# Patient Record
Sex: MALE | Race: AMERICAN INDIAN OR ALASKA NATIVE | NOT HISPANIC OR LATINO | Employment: OTHER | URBAN - METROPOLITAN AREA
[De-identification: names, ages, dates, MRNs, and addresses within clinical notes are randomized per-mention and may not be internally consistent; named-entity substitution may affect disease eponyms.]

---

## 2020-09-19 ENCOUNTER — APPOINTMENT (EMERGENCY)
Dept: CT IMAGING | Facility: HOSPITAL | Age: 65
DRG: 558 | End: 2020-09-19
Payer: MEDICARE

## 2020-09-19 ENCOUNTER — HOSPITAL ENCOUNTER (INPATIENT)
Facility: HOSPITAL | Age: 65
LOS: 5 days | Discharge: HOME | DRG: 558 | End: 2020-09-24
Attending: EMERGENCY MEDICINE | Admitting: INTERNAL MEDICINE
Payer: MEDICARE

## 2020-09-19 ENCOUNTER — APPOINTMENT (EMERGENCY)
Dept: NON INVASIVE DIAGNOSTICS | Facility: HOSPITAL | Age: 65
DRG: 558 | End: 2020-09-19
Payer: MEDICARE

## 2020-09-19 ENCOUNTER — APPOINTMENT (EMERGENCY)
Dept: RADIOLOGY | Facility: HOSPITAL | Age: 65
DRG: 558 | End: 2020-09-19
Payer: MEDICARE

## 2020-09-19 DIAGNOSIS — M79.601 PAIN OF RIGHT ARM: Primary | ICD-10-CM

## 2020-09-19 DIAGNOSIS — L03.113 CELLULITIS OF RIGHT UPPER EXTREMITY: ICD-10-CM

## 2020-09-19 DIAGNOSIS — L03.90 CELLULITIS: ICD-10-CM

## 2020-09-19 PROBLEM — R91.1 LUNG NODULE SEEN ON IMAGING STUDY: Status: ACTIVE | Noted: 2020-09-19

## 2020-09-19 LAB
ALBUMIN SERPL BCP-MCNC: 3.9 G/DL (ref 3.5–5)
ALP SERPL-CCNC: 81 U/L (ref 46–116)
ALT SERPL W P-5'-P-CCNC: 38 U/L (ref 12–78)
ANION GAP SERPL CALCULATED.3IONS-SCNC: 7 MMOL/L (ref 4–13)
APTT PPP: 34 SECONDS (ref 23–37)
AST SERPL W P-5'-P-CCNC: 18 U/L (ref 5–45)
BASOPHILS # BLD AUTO: 0.03 THOUSANDS/ΜL (ref 0–0.1)
BASOPHILS NFR BLD AUTO: 0 % (ref 0–1)
BILIRUB SERPL-MCNC: 0.63 MG/DL (ref 0.2–1)
BUN SERPL-MCNC: 12 MG/DL (ref 5–25)
CALCIUM SERPL-MCNC: 9 MG/DL (ref 8.3–10.1)
CHLORIDE SERPL-SCNC: 103 MMOL/L (ref 100–108)
CK MB SERPL-MCNC: 1.4 % (ref 0–2.5)
CK MB SERPL-MCNC: 2.7 NG/ML (ref 0–5)
CK SERPL-CCNC: 200 U/L (ref 39–308)
CO2 SERPL-SCNC: 28 MMOL/L (ref 21–32)
CREAT SERPL-MCNC: 1.05 MG/DL (ref 0.6–1.3)
EOSINOPHIL # BLD AUTO: 0.22 THOUSAND/ΜL (ref 0–0.61)
EOSINOPHIL NFR BLD AUTO: 3 % (ref 0–6)
ERYTHROCYTE [DISTWIDTH] IN BLOOD BY AUTOMATED COUNT: 13.6 % (ref 11.6–15.1)
GFR SERPL CREATININE-BSD FRML MDRD: 75 ML/MIN/1.73SQ M
GLUCOSE SERPL-MCNC: 108 MG/DL (ref 65–140)
GLUCOSE SERPL-MCNC: 137 MG/DL (ref 65–140)
GLUCOSE SERPL-MCNC: 147 MG/DL (ref 65–140)
GLUCOSE SERPL-MCNC: 196 MG/DL (ref 65–140)
HCT VFR BLD AUTO: 44.7 % (ref 36.5–49.3)
HGB BLD-MCNC: 14.8 G/DL (ref 12–17)
IMM GRANULOCYTES # BLD AUTO: 0.03 THOUSAND/UL (ref 0–0.2)
IMM GRANULOCYTES NFR BLD AUTO: 0 % (ref 0–2)
INR PPP: 1.1 (ref 0.84–1.19)
LYMPHOCYTES # BLD AUTO: 1.89 THOUSANDS/ΜL (ref 0.6–4.47)
LYMPHOCYTES NFR BLD AUTO: 23 % (ref 14–44)
MCH RBC QN AUTO: 28 PG (ref 26.8–34.3)
MCHC RBC AUTO-ENTMCNC: 33.1 G/DL (ref 31.4–37.4)
MCV RBC AUTO: 85 FL (ref 82–98)
MONOCYTES # BLD AUTO: 0.77 THOUSAND/ΜL (ref 0.17–1.22)
MONOCYTES NFR BLD AUTO: 9 % (ref 4–12)
NEUTROPHILS # BLD AUTO: 5.23 THOUSANDS/ΜL (ref 1.85–7.62)
NEUTS SEG NFR BLD AUTO: 65 % (ref 43–75)
NRBC BLD AUTO-RTO: 0 /100 WBCS
PLATELET # BLD AUTO: 184 THOUSANDS/UL (ref 149–390)
PMV BLD AUTO: 10.5 FL (ref 8.9–12.7)
POTASSIUM SERPL-SCNC: 3.8 MMOL/L (ref 3.5–5.3)
PROT SERPL-MCNC: 7.8 G/DL (ref 6.4–8.2)
PROTHROMBIN TIME: 14 SECONDS (ref 11.6–14.5)
RBC # BLD AUTO: 5.28 MILLION/UL (ref 3.88–5.62)
SODIUM SERPL-SCNC: 138 MMOL/L (ref 136–145)
TROPONIN I SERPL-MCNC: <0.02 NG/ML
WBC # BLD AUTO: 8.17 THOUSAND/UL (ref 4.31–10.16)

## 2020-09-19 PROCEDURE — 73030 X-RAY EXAM OF SHOULDER: CPT

## 2020-09-19 PROCEDURE — 99285 EMERGENCY DEPT VISIT HI MDM: CPT | Performed by: EMERGENCY MEDICINE

## 2020-09-19 PROCEDURE — 96374 THER/PROPH/DIAG INJ IV PUSH: CPT

## 2020-09-19 PROCEDURE — 73060 X-RAY EXAM OF HUMERUS: CPT

## 2020-09-19 PROCEDURE — 84484 ASSAY OF TROPONIN QUANT: CPT | Performed by: EMERGENCY MEDICINE

## 2020-09-19 PROCEDURE — 99222 1ST HOSP IP/OBS MODERATE 55: CPT | Performed by: SURGERY

## 2020-09-19 PROCEDURE — 93005 ELECTROCARDIOGRAM TRACING: CPT

## 2020-09-19 PROCEDURE — 85730 THROMBOPLASTIN TIME PARTIAL: CPT | Performed by: EMERGENCY MEDICINE

## 2020-09-19 PROCEDURE — 85610 PROTHROMBIN TIME: CPT | Performed by: EMERGENCY MEDICINE

## 2020-09-19 PROCEDURE — 96375 TX/PRO/DX INJ NEW DRUG ADDON: CPT

## 2020-09-19 PROCEDURE — 73070 X-RAY EXAM OF ELBOW: CPT

## 2020-09-19 PROCEDURE — 87040 BLOOD CULTURE FOR BACTERIA: CPT | Performed by: EMERGENCY MEDICINE

## 2020-09-19 PROCEDURE — 85025 COMPLETE CBC W/AUTO DIFF WBC: CPT | Performed by: EMERGENCY MEDICINE

## 2020-09-19 PROCEDURE — 96361 HYDRATE IV INFUSION ADD-ON: CPT

## 2020-09-19 PROCEDURE — G1004 CDSM NDSC: HCPCS

## 2020-09-19 PROCEDURE — 82553 CREATINE MB FRACTION: CPT | Performed by: EMERGENCY MEDICINE

## 2020-09-19 PROCEDURE — 82550 ASSAY OF CK (CPK): CPT | Performed by: EMERGENCY MEDICINE

## 2020-09-19 PROCEDURE — 99223 1ST HOSP IP/OBS HIGH 75: CPT | Performed by: INTERNAL MEDICINE

## 2020-09-19 PROCEDURE — 72125 CT NECK SPINE W/O DYE: CPT

## 2020-09-19 PROCEDURE — 80053 COMPREHEN METABOLIC PANEL: CPT | Performed by: EMERGENCY MEDICINE

## 2020-09-19 PROCEDURE — 82948 REAGENT STRIP/BLOOD GLUCOSE: CPT

## 2020-09-19 PROCEDURE — 99285 EMERGENCY DEPT VISIT HI MDM: CPT

## 2020-09-19 PROCEDURE — 36415 COLL VENOUS BLD VENIPUNCTURE: CPT | Performed by: EMERGENCY MEDICINE

## 2020-09-19 PROCEDURE — 93971 EXTREMITY STUDY: CPT | Performed by: SURGERY

## 2020-09-19 PROCEDURE — 93971 EXTREMITY STUDY: CPT

## 2020-09-19 PROCEDURE — 71275 CT ANGIOGRAPHY CHEST: CPT

## 2020-09-19 RX ORDER — HYDROMORPHONE HCL/PF 1 MG/ML
1 SYRINGE (ML) INJECTION ONCE
Status: COMPLETED | OUTPATIENT
Start: 2020-09-19 | End: 2020-09-19

## 2020-09-19 RX ORDER — EXENATIDE 2 MG/.65ML
2 INJECTION, SUSPENSION, EXTENDED RELEASE SUBCUTANEOUS WEEKLY
COMMUNITY
Start: 2020-04-09

## 2020-09-19 RX ORDER — OXYCODONE HYDROCHLORIDE AND ACETAMINOPHEN 5; 325 MG/1; MG/1
1 TABLET ORAL ONCE
Status: COMPLETED | OUTPATIENT
Start: 2020-09-19 | End: 2020-09-19

## 2020-09-19 RX ORDER — CEFAZOLIN SODIUM 2 G/50ML
2000 SOLUTION INTRAVENOUS ONCE
Status: COMPLETED | OUTPATIENT
Start: 2020-09-19 | End: 2020-09-19

## 2020-09-19 RX ORDER — SODIUM CHLORIDE 9 MG/ML
50 INJECTION, SOLUTION INTRAVENOUS CONTINUOUS
Status: DISCONTINUED | OUTPATIENT
Start: 2020-09-19 | End: 2020-09-22

## 2020-09-19 RX ORDER — ONDANSETRON 2 MG/ML
4 INJECTION INTRAMUSCULAR; INTRAVENOUS EVERY 4 HOURS PRN
Status: DISCONTINUED | OUTPATIENT
Start: 2020-09-19 | End: 2020-09-24 | Stop reason: HOSPADM

## 2020-09-19 RX ORDER — OXYCODONE HYDROCHLORIDE 5 MG/1
5 TABLET ORAL EVERY 4 HOURS PRN
Status: DISCONTINUED | OUTPATIENT
Start: 2020-09-19 | End: 2020-09-22

## 2020-09-19 RX ORDER — FOLIC ACID/MULTIVIT,IRON,MINER .4-18-35
1 TABLET,CHEWABLE ORAL DAILY
COMMUNITY

## 2020-09-19 RX ORDER — ACETAMINOPHEN 325 MG/1
650 TABLET ORAL EVERY 6 HOURS PRN
Status: DISCONTINUED | OUTPATIENT
Start: 2020-09-19 | End: 2020-09-24 | Stop reason: HOSPADM

## 2020-09-19 RX ORDER — ASPIRIN 81 MG/1
81 TABLET ORAL DAILY
COMMUNITY
Start: 2019-11-27 | End: 2020-11-26

## 2020-09-19 RX ORDER — KETOROLAC TROMETHAMINE 30 MG/ML
30 INJECTION, SOLUTION INTRAMUSCULAR; INTRAVENOUS EVERY 6 HOURS SCHEDULED
Status: DISPENSED | OUTPATIENT
Start: 2020-09-19 | End: 2020-09-21

## 2020-09-19 RX ORDER — CEFAZOLIN SODIUM 2 G/50ML
2000 SOLUTION INTRAVENOUS EVERY 8 HOURS
Status: DISCONTINUED | OUTPATIENT
Start: 2020-09-20 | End: 2020-09-24

## 2020-09-19 RX ORDER — KETOROLAC TROMETHAMINE 30 MG/ML
15 INJECTION, SOLUTION INTRAMUSCULAR; INTRAVENOUS ONCE
Status: COMPLETED | OUTPATIENT
Start: 2020-09-19 | End: 2020-09-19

## 2020-09-19 RX ORDER — HYDROMORPHONE HCL/PF 1 MG/ML
0.5 SYRINGE (ML) INJECTION EVERY 4 HOURS PRN
Status: DISCONTINUED | OUTPATIENT
Start: 2020-09-19 | End: 2020-09-24 | Stop reason: HOSPADM

## 2020-09-19 RX ORDER — TIZANIDINE 2 MG/1
4 TABLET ORAL 3 TIMES DAILY
Status: DISCONTINUED | OUTPATIENT
Start: 2020-09-19 | End: 2020-09-24 | Stop reason: HOSPADM

## 2020-09-19 RX ORDER — LISINOPRIL 20 MG/1
40 TABLET ORAL DAILY
Status: DISCONTINUED | OUTPATIENT
Start: 2020-09-20 | End: 2020-09-24 | Stop reason: HOSPADM

## 2020-09-19 RX ORDER — LISINOPRIL 40 MG/1
40 TABLET ORAL DAILY
COMMUNITY
Start: 2020-04-09

## 2020-09-19 RX ORDER — ASPIRIN 81 MG/1
81 TABLET ORAL DAILY
Status: DISCONTINUED | OUTPATIENT
Start: 2020-09-20 | End: 2020-09-24 | Stop reason: HOSPADM

## 2020-09-19 RX ADMIN — KETOROLAC TROMETHAMINE 30 MG: 30 INJECTION, SOLUTION INTRAMUSCULAR at 18:42

## 2020-09-19 RX ADMIN — KETOROLAC TROMETHAMINE 15 MG: 30 INJECTION, SOLUTION INTRAMUSCULAR at 15:00

## 2020-09-19 RX ADMIN — CEFAZOLIN SODIUM 2000 MG: 2 SOLUTION INTRAVENOUS at 17:59

## 2020-09-19 RX ADMIN — KETOROLAC TROMETHAMINE 30 MG: 30 INJECTION, SOLUTION INTRAMUSCULAR at 23:12

## 2020-09-19 RX ADMIN — SODIUM CHLORIDE 50 ML/HR: 0.9 INJECTION, SOLUTION INTRAVENOUS at 18:47

## 2020-09-19 RX ADMIN — SODIUM CHLORIDE 50 ML/HR: 0.9 INJECTION, SOLUTION INTRAVENOUS at 17:53

## 2020-09-19 RX ADMIN — TIZANIDINE 4 MG: 2 TABLET ORAL at 18:40

## 2020-09-19 RX ADMIN — IOHEXOL 85 ML: 350 INJECTION, SOLUTION INTRAVENOUS at 14:26

## 2020-09-19 RX ADMIN — SODIUM CHLORIDE 1000 ML: 0.9 INJECTION, SOLUTION INTRAVENOUS at 13:25

## 2020-09-19 RX ADMIN — OXYCODONE HYDROCHLORIDE AND ACETAMINOPHEN 1 TABLET: 5; 325 TABLET ORAL at 13:56

## 2020-09-19 RX ADMIN — OXYCODONE HYDROCHLORIDE 5 MG: 5 TABLET ORAL at 18:40

## 2020-09-19 RX ADMIN — HYDROMORPHONE HYDROCHLORIDE 1 MG: 1 INJECTION, SOLUTION INTRAMUSCULAR; INTRAVENOUS; SUBCUTANEOUS at 16:43

## 2020-09-19 NOTE — H&P
H&P- Janet Coburn  1955, 60 y o  male MRN: [id number]    Unit/Bed#: ED 21 Encounter: [id number]    Primary Care Provider: Alessio Agarwal MD   Date and time admitted to hospital: 9/19/2020 12:10 PM        Assessment and Plan  * Cellulitis of right upper extremity  Assessment & Plan  · Cellulitis of right upper extremity including elbow shoulder and chest wall  · Extensive imaging negative for thrombus or abscess  · Continue IV cefazolin for cellulitis  · Have orthopedics evaluate for olecranon bursitis  · Will schedule ketorolac and tizanidine for musculoskeletal component of pain    Hypertension  Assessment & Plan  · Essential hypertension continue lisinopril 40 mg daily    Diabetes mellitus [facility])  Assessment & Plan  Lab Results   Component Value Date    HGBA1C 4 9 03/15/2019     Recent Labs     09/19/20  1850   POCGLU 108     · Diabetes mellitus prior to admission exenatide weekly  Will be held in favor sliding scale insulin during hospitalization      VTE Prophylaxis: Enoxaparin (Lovenox)  Code Status: Level 1 - Full Code  Anticipated Length of Stay:  Patient will be admitted on an Inpatient basis with an anticipated length of stay of  greater than 2 midnights  Justification for Hospital Stay: Cellulitis of right upper extremity  Total Time for Visit, including Counseling / Coordination of Care: xx mins  Greater than 50% of this total time spent on direct patient counseling and coordination of care  Chief Complaint:     Chief Complaint   Patient presents with    Arm Swelling     with right arm pain and swelling since yesterday getting worse also c/o tinggling     History of Present Illness:    Janet Coburn  is a 60 y o  male who presents with worsening right upper extremity pain between right neck/chest wall and right elbow  The patient delivers medications for local nursing homes and 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 injury    Symptoms began yesterday but worsened throughout the morning today prompting evaluation in the emergency department  He denies any fevers or chills  In the emergency department there was some concern of compartment syndrome or abscess and the patient underwent significant amount of imaging including CT scan and venous duplex negative for DVT  He was seen by surgical service and did not appear to have compartment syndrome  Review of Systems:  Review of Systems   Constitutional: Negative for chills, diaphoresis and fever  HENT: Negative for dental problem and facial swelling  Eyes: Negative for photophobia, pain and visual disturbance  Respiratory: Negative for shortness of breath, wheezing and stridor  Cardiovascular: Negative for chest pain and palpitations  Gastrointestinal: Negative for abdominal distention, abdominal pain, diarrhea, nausea and vomiting  Genitourinary: Negative for dysuria, hematuria and urgency  Musculoskeletal: Positive for arthralgias and myalgias  Negative for back pain  Pain right elbow shoulder and chest   Skin: Negative for rash  Neurological: Negative for dizziness, seizures, speech difficulty, numbness and headaches  Psychiatric/Behavioral: Negative for agitation and suicidal ideas  The patient is not nervous/anxious  All other systems reviewed and are negative  Past Medical and Surgical History:   Past Medical History:   Diagnosis Date    Diabetes mellitus ([facility] [address] )     Hypertension      Past Surgical History:   Procedure Laterality Date    HAND SURGERY      KNEE SURGERY      SPINE SURGERY       Meds/Allergies: Allergies:    Allergies   Allergen Reactions    Morphine GI Intolerance and Itching    Celecoxib Other (See Comments)     pt can't urinate      Fentanyl      Other reaction(s): dizziness      Finasteride GI Intolerance    Naproxen      Other reaction(s): worsening knee pain      Oxycodone      Other reaction(s): itching     Prior to Admission Medications   Prescriptions Last Dose Informant Patient Reported? Taking? Exenatide ER (Bydureon) 2 MG PEN   Yes Yes   Sig: Inject 2 mg under the skin once a week   aspirin (Aspirin 81) 81 mg EC tablet   Yes Yes   Sig: Take 81 mg by mouth daily   lisinopril (ZESTRIL) 40 mg tablet   Yes Yes   Sig: Take 40 mg by mouth daily   multivitamin-iron-minerals-folic acid (CENTRUM) chewable tablet   Yes Yes   Sig: Chew 1 tablet daily      Facility-Administered Medications: None     Social History:     Social History     Socioeconomic History    Marital status: Single     Spouse name: Not on file    Number of children: Not on file    Years of education: Not on file    Highest education level: Not on file   Occupational History    Not on file   Social Needs    Financial resource strain: Not on file    Food insecurity     Worry: Not on file     Inability: Not on file    Transportation needs     Medical: Not on file     Non-medical: Not on file   Tobacco Use    Smoking status: Never Smoker    Smokeless tobacco: Never Used   Substance and Sexual Activity    Alcohol use:  Yes     Alcohol/week: 1 0 standard drinks     Types: 1 Cans of beer per week    Drug use: Never    Sexual activity: Not on file   Lifestyle    Physical activity     Days per week: Not on file     Minutes per session: Not on file    Stress: Not on file   Relationships    Social connections     Talks on phone: Not on file     Gets together: Not on file     Attends Christian service: Not on file     Active member of club or organization: Not on file     Attends meetings of clubs or organizations: Not on file     Relationship status: Not on file    Intimate partner violence     Fear of current or ex partner: Not on file     Emotionally abused: Not on file     Physically abused: Not on file     Forced sexual activity: Not on file   Other Topics Concern    Not on file   Social History Narrative    Not on file     Patient Pre-hospital Living Situation:   Patient Pre-hospital Level of Mobility:   Patient Pre-hospital Diet Restrictions:     Family History:  History reviewed  No pertinent family history  Physical Exam:   Vitals:   Blood Pressure: 166/81 (09/19/20 1802)  Pulse: 68 (09/19/20 1802)  Temperature: (!) 97 4 °F (36 3 °C) (09/19/20 1204)  Temp Source: Tympanic (09/19/20 1204)  Respirations: 19 (09/19/20 1802)  Weight - Scale: 125 kg (274 lb 14 6 oz) (09/19/20 1204)  SpO2: 98 % (09/19/20 1802)    Physical Exam  Vitals signs reviewed  Constitutional:       General: He is not in acute distress  HENT:      Head: Atraumatic  Mouth/Throat:      Mouth: Mucous membranes are moist    Eyes:      Extraocular Movements: Extraocular movements intact  Pupils: Pupils are equal, round, and reactive to light  Cardiovascular:      Rate and Rhythm: Regular rhythm  Heart sounds: Normal heart sounds  Pulmonary:      Effort: Pulmonary effort is normal       Breath sounds: No wheezing  Abdominal:      General: Bowel sounds are normal       Palpations: Abdomen is soft  Tenderness: There is no abdominal tenderness  There is no rebound  Musculoskeletal:         General: Tenderness (Right chest wall shoulder elbow tender to palpation) present  No swelling  Skin:     General: Skin is warm and dry  Neurological:      General: No focal deficit present  Mental Status: He is alert and oriented to person, place, and time  Cranial Nerves: No cranial nerve deficit  Psychiatric:         Mood and Affect: Mood normal        Lab Results: I have personally reviewed pertinent reports      Results from last 7 days   Lab Units 09/19/20  1326   WBC Thousand/uL 8 17   HEMOGLOBIN g/dL 14 8   HEMATOCRIT % 44 7   PLATELETS Thousands/uL 184   NEUTROS PCT % 65   LYMPHS PCT % 23   MONOS PCT % 9   EOS PCT % 3     Results from last 7 days   Lab Units 09/19/20  1326   SODIUM mmol/L 138   POTASSIUM mmol/L 3 8   CHLORIDE mmol/L 103   CO2 mmol/L 28   ANION GAP mmol/L 7   BUN mg/dL 12   CREATININE mg/dL 1 05   CALCIUM mg/dL 9 0   ALBUMIN g/dL 3 9   TOTAL BILIRUBIN mg/dL 0 63   ALK PHOS U/L 81   ALT U/L 38   AST U/L 18   EGFR ml/min/1 73sq m 75   GLUCOSE RANDOM mg/dL 137     Results from last 7 days   Lab Units 09/19/20  1326   INR  1 10     Results from last 7 days   Lab Units 09/19/20  1456 09/19/20  1326   CK TOTAL U/L  --  200   TROPONIN I ng/mL <0 02  --    CK MB INDEX %  --  1 4           Imaging: I have personally reviewed pertinent films in PACS  Xr Shoulder 2+ Views Right    Result Date: 9/19/2020  Impression: No acute osseous abnormality  Mild osteoarthritis acromioclavicular joint  Workstation performed: [id number]     Xr Humerus Right    Result Date: 9/19/2020  Impression: No acute osseous abnormality  Workstation performed: [id number]     Xr Elbow 2 Vw Right    Result Date: 9/19/2020  Impression: No acute osseous abnormality  Soft tissue swelling over the olecranon process, which could be olecranon bursitis  Workstation performed: [id number]     Ct Cervical Spine Without Contrast    Result Date: 9/19/2020  Impression: Mild multilevel degenerative spondylosis No cervical spine fracture or traumatic malalignment  Workstation performed: [id number]       EKG, Pathology, and Other Studies Reviewed on Admission:   Reviewed outside records    Allscripts/ Epic Records Reviewed: Yes    ** Please Note: This note has been constructed using a voice recognition system   **

## 2020-09-19 NOTE — ASSESSMENT & PLAN NOTE
· Cellulitis of right upper extremity including elbow shoulder and chest wall  · Extensive imaging negative for thrombus or abscess  · Continue IV cefazolin for cellulitis  · Have orthopedics evaluate for olecranon bursitis  · Will schedule ketorolac and tizanidine for musculoskeletal component of pain

## 2020-09-19 NOTE — ED PROVIDER NOTES
History  Chief Complaint   Patient presents with    Arm Swelling     with right arm pain and swelling since yesterday getting worse also c/o tinggling       History provided by:  Patient   used: No    Arm Pain   Location:  Right arm  Quality:  Pain and swelling  Severity:  Moderate  Onset quality:  Gradual  Duration:  1 day  Timing:  Intermittent  Progression:  Waxing and waning  Chronicity:  New  Context:  Right arm pain and swelling for past 1 day, no trauma  Relieved by:  Nothing  Worsened by:  Nothing  Ineffective treatments:  Nothing  Associated symptoms: no abdominal pain, no chest pain, no cough, no fever, no headaches, no loss of consciousness, no rash, no shortness of breath, no sore throat and no vomiting    Risk factors:  HTN, DM      Prior to Admission Medications   Prescriptions Last Dose Informant Patient Reported? Taking? Exenatide ER (Bydureon) 2 MG PEN   Yes Yes   Sig: Inject 2 mg under the skin once a week   aspirin (Aspirin 81) 81 mg EC tablet   Yes Yes   Sig: Take 81 mg by mouth daily   lisinopril (ZESTRIL) 40 mg tablet   Yes Yes   Sig: Take 40 mg by mouth daily   multivitamin-iron-minerals-folic acid (CENTRUM) chewable tablet   Yes Yes   Sig: Chew 1 tablet daily      Facility-Administered Medications: None       Past Medical History:   Diagnosis Date    Diabetes mellitus ([facility] [address] )     Hypertension        Past Surgical History:   Procedure Laterality Date    HAND SURGERY      KNEE SURGERY      SPINE SURGERY         History reviewed  No pertinent family history  I have reviewed and agree with the history as documented  E-Cigarette/Vaping    E-Cigarette Use Never User      E-Cigarette/Vaping Substances     Social History     Tobacco Use    Smoking status: Never Smoker    Smokeless tobacco: Never Used   Substance Use Topics    Alcohol use:  Yes     Alcohol/week: 1 0 standard drinks     Types: 1 Cans of beer per week    Drug use: Never       Review of Systems Constitutional: Negative for chills and fever  HENT: Negative for facial swelling, sore throat and trouble swallowing  Eyes: Negative for pain and visual disturbance  Respiratory: Negative for cough, chest tightness and shortness of breath  Cardiovascular: Negative for chest pain and leg swelling  Gastrointestinal: Negative for abdominal pain and vomiting  Genitourinary: Negative for dysuria and flank pain  Musculoskeletal: Negative for back pain, neck pain and neck stiffness  Right arm pain and swelling   Skin: Negative for pallor and rash  Allergic/Immunologic: Negative for environmental allergies and immunocompromised state  Neurological: Negative for dizziness, loss of consciousness and headaches  Hematological: Negative for adenopathy  Does not bruise/bleed easily  Psychiatric/Behavioral: Negative for agitation and behavioral problems  All other systems reviewed and are negative  Physical Exam  Physical Exam  Vitals signs and nursing note reviewed  Constitutional:       General: He is not in acute distress  Appearance: He is well-developed  HENT:      Head: Normocephalic and atraumatic  Neck:      Musculoskeletal: Normal range of motion and neck supple  Cardiovascular:      Rate and Rhythm: Normal rate and regular rhythm  Pulmonary:      Effort: Pulmonary effort is normal    Abdominal:      Palpations: Abdomen is soft  Tenderness: There is no abdominal tenderness  There is no guarding or rebound  Musculoskeletal:         General: Swelling and tenderness present  Comments: Generalized swelling and pain of right upper arm, elbow and forearm, swelling of olecranon bursa, no erythema or crepitus, NV intact distally   Skin:     General: Skin is warm and dry  Neurological:      Mental Status: He is alert and oriented to person, place, and time           Vital Signs  ED Triage Vitals [09/19/20 1204]   Temperature Pulse Respirations Blood Pressure SpO2 Andreea Rodrigues 97 4 °F (36 3 °C) 65 18 160/72 97 %      Temp Source Heart Rate Source Patient Position - Orthostatic VS BP Location FiO2 (%)   Tympanic Monitor Sitting Right arm --      Pain Score       9           Vitals:    09/19/20 1204 09/19/20 1529 09/19/20 1700 09/19/20 1802   BP: 160/72 163/79 153/72 166/81   Pulse: 65 68 70 68   Patient Position - Orthostatic VS: Sitting Lying  Lying         Visual Acuity      ED Medications  Medications   ceFAZolin (ANCEF) IVPB (premix in dextrose) 2,000 mg 50 mL (2,000 mg Intravenous New Bag 9/19/20 1759)   sodium chloride 0 9 % infusion (50 mL/hr Intravenous New Bag 9/19/20 1753)   insulin lispro (HumaLOG) 100 units/mL subcutaneous injection 1-6 Units (has no administration in time range)   sodium chloride 0 9 % bolus 1,000 mL (0 mL Intravenous Stopped 9/19/20 1439)   oxyCODONE-acetaminophen (PERCOCET) 5-325 mg per tablet 1 tablet (1 tablet Oral Given 9/19/20 1356)   iohexol (OMNIPAQUE) 350 MG/ML injection (SINGLE-DOSE) 85 mL (85 mL Intravenous Given 9/19/20 1426)   HYDROmorphone (DILAUDID) injection 1 mg (1 mg Intravenous Given 9/19/20 1643)   ketorolac (TORADOL) injection 15 mg (15 mg Intravenous Given 9/19/20 1500)       Diagnostic Studies  Results Reviewed     Procedure Component Value Units Date/Time    Blood culture #2 [[id number]] Collected:  09/19/20 1731    Lab Status: In process Specimen:  Blood from Hand, Left Updated:  09/19/20 1752    Blood culture #1 [[id number]] Collected:  09/19/20 1748    Lab Status:   In process Specimen:  Blood from Arm, Left Updated:  09/19/20 1752    Troponin I [[id number]]  (Normal) Collected:  09/19/20 1456    Lab Status:  Final result Specimen:  Blood from Arm, Left Updated:  09/19/20 1539     Troponin I <0 02 ng/mL     CKMB [[id number]]  (Normal) Collected:  09/19/20 1326    Lab Status:  Final result Specimen:  Blood from Arm, Left Updated:  09/19/20 1404     CK-MB Index 1 4 %      CK-MB 2 7 ng/mL     CK Total with Reflex CKMB [[id number]] (Normal) Collected:  09/19/20 1326    Lab Status:  Final result Specimen:  Blood from Arm, Left Updated:  09/19/20 1404     Total  U/L     Comprehensive metabolic panel [[id number]] Collected:  09/19/20 1326    Lab Status:  Final result Specimen:  Blood from Arm, Left Updated:  09/19/20 1349     Sodium 138 mmol/L      Potassium 3 8 mmol/L      Chloride 103 mmol/L      CO2 28 mmol/L      ANION GAP 7 mmol/L      BUN 12 mg/dL      Creatinine 1 05 mg/dL      Glucose 137 mg/dL      Calcium 9 0 mg/dL      AST 18 U/L      ALT 38 U/L      Alkaline Phosphatase 81 U/L      Total Protein 7 8 g/dL      Albumin 3 9 g/dL      Total Bilirubin 0 63 mg/dL      eGFR 75 ml/min/1 73sq m     Narrative:       [place] guidelines for Chronic Kidney Disease (CKD):     Stage 1 with normal or high GFR (GFR > 90 mL/min/1 73 square meters)    Stage 2 Mild CKD (GFR = 60-89 mL/min/1 73 square meters)    Stage 3A Moderate CKD (GFR = 45-59 mL/min/1 73 square meters)    Stage 3B Moderate CKD (GFR = 30-44 mL/min/1 73 square meters)    Stage 4 Severe CKD (GFR = 15-29 mL/min/1 73 square meters)    Stage 5 End Stage CKD (GFR <15 mL/min/1 73 square meters)  Note: GFR calculation is accurate only with a steady state creatinine    Protime-INR [[id number]]  (Normal) Collected:  09/19/20 1326    Lab Status:  Final result Specimen:  Blood from Arm, Left Updated:  09/19/20 1341     Protime 14 0 seconds      INR 1 10    APTT [[id number]]  (Normal) Collected:  09/19/20 1326    Lab Status:  Final result Specimen:  Blood from Arm, Left Updated:  09/19/20 1341     PTT 34 seconds     CBC and differential [[id number]] Collected:  09/19/20 1326    Lab Status:  Final result Specimen:  Blood from Arm, Left Updated:  09/19/20 1330     WBC 8 17 Thousand/uL      RBC 5 28 Million/uL      Hemoglobin 14 8 g/dL      Hematocrit 44 7 %      MCV 85 fL      MCH 28 0 pg      MCHC 33 1 g/dL      RDW 13 6 %      MPV 10 5 fL      Platelets 162 Thousands/uL      nRBC 0 /100 WBCs      Neutrophils Relative 65 %      Immat GRANS % 0 %      Lymphocytes Relative 23 %      Monocytes Relative 9 %      Eosinophils Relative 3 %      Basophils Relative 0 %      Neutrophils Absolute 5 23 Thousands/µL      Immature Grans Absolute 0 03 Thousand/uL      Lymphocytes Absolute 1 89 Thousands/µL      Monocytes Absolute 0 77 Thousand/µL      Eosinophils Absolute 0 22 Thousand/µL      Basophils Absolute 0 03 Thousands/µL                  VAS upper limb venous duplex scan, unilateral/limited   Final Result by Javier Alex MD (09/19 1749)      CT cervical spine without contrast   Final Result by Porsche Aguilar MD (09/19 1441)   Mild multilevel degenerative spondylosis      No cervical spine fracture or traumatic malalignment  Workstation performed: [id number]         XR shoulder 2+ views RIGHT   Final Result by Ayesha iFsher MD (09/19 1411)      No acute osseous abnormality  Mild osteoarthritis acromioclavicular joint  Workstation performed: [id number]         XR humerus RIGHT   Final Result by Ayesha Fisher MD (09/19 1412)      No acute osseous abnormality  Workstation performed: [id number]         XR elbow 2 vw RIGHT   Final Result by Ayesha Fisher MD (09/19 1413)      No acute osseous abnormality  Soft tissue swelling over the olecranon process, which could be olecranon bursitis        Workstation performed: [id number]         CTA chest wo w contrast    (Results Pending)              Procedures  ECG 12 Lead Documentation Only    Date/Time: 9/19/2020 3:23 PM  Performed by: Jenny Tse MD  Authorized by: Jenny Tse MD     Indications / Diagnosis:  Right arm pain  ECG reviewed by me, the ED Provider: yes    Patient location:  ED  Interpretation:     Interpretation: normal    Rate:     ECG rate assessment: normal    Rhythm:     Rhythm: sinus rhythm    Ectopy:     Ectopy: none    QRS:     QRS axis:  Normal  Conduction:     Conduction: normal    ST segments:     ST segments:  Normal  T waves:     T waves: normal               ED Course  ED Course as of Sep 19 1817   Sat Sep 19, 2020   1341 WBC: 8 17   1341 Hemoglobin: 14 8   1342 CBC wnl  Platelet Count: 386   0413 Patient having significant pain, has many allergies, says he can take Percocet, we will order  1406 CK wnl  Total CK: 200   1409 XR RUE (Shoulder, Arm, elbow) reviewed, discussed with Dr Violet Dancer, Radiologist, specifically elbow, no osseous injury, no joint effusion; possible Olecranon bursitis  1529 Duplex is negative for DVT and negative for superficial thrombophlebitis in his right upper ext as per Vascular tech (Maryam Granados) report  1547 Patient c/o pain, we will give Dilaudid  Surgery contacted for possible Nec fasc; however patient remains stable, non-toxic, normal labs  1705 CT chest negative for any acute thoracic pathology as per Dr Katy Reynoso, IR Rad       0707 Seen by Surgery resident, discussed with Attending Dr Ray Yousif, do not think its Nec Fasc or Compartment syndrome  We will admit for possible RUE Cellulitis  Discussed with Dr Amos Posada, [facility]  MDM  Number of Diagnoses or Management Options  Cellulitis:   Pain of right arm:   Diagnosis management comments: Patient is a 61-year-old male, history of hypertension, diabetes, comes in with complaints of right upper extremity pain and swelling, started yesterday, denies any injuries, no fever, chills, symptoms came on gradually, got worse overnight; patient also complains of lower neck and the scapular region pain, no history of DVT/PE  On exam, patient is conscious, alert, vital signs are stable, there is generalized swelling of the right upper extremity involving upper arm, elbow, forearm, enlarged olecranon bursitis also noted, strong radial pulse, neurovascular intact distally    Differential diagnosis:  Right arm cellulitis, cervical radiculopathy, thoracic outlet syndrome  Will check labs, x-ray, get CT scan of C-spine and chest        Amount and/or Complexity of Data Reviewed  Clinical lab tests: reviewed and ordered  Tests in the radiology section of CPT®: ordered and reviewed  Tests in the medicine section of CPT®: ordered and reviewed  Independent visualization of images, tracings, or specimens: yes        Disposition  Final diagnoses:   Pain of right arm   Cellulitis     Time reflects when diagnosis was documented in both MDM as applicable and the Disposition within this note     Time User Action Codes Description Comment    9/19/2020  4:25 PM Mary Running Add [M79 [id number]] Pain of right arm     9/19/2020  5:25 PM Mary Running Add [G06 [id number]] Cellulitis       ED Disposition     ED Disposition Condition Date/Time Comment    Admit Stable Sat Sep 19, 2020  5:25 PM Case was discussed with Dr Amauri Lopez and the patient's admission status was agreed to be Admission Status: inpatient status to the service of Dr Amauri Lopez  Follow-up Information    None         Current Discharge Medication List      CONTINUE these medications which have NOT CHANGED    Details   aspirin (Aspirin 81) 81 mg EC tablet Take 81 mg by mouth daily      Exenatide ER (Bydureon) 2 MG PEN Inject 2 mg under the skin once a week      lisinopril (ZESTRIL) 40 mg tablet Take 40 mg by mouth daily      multivitamin-iron-minerals-folic acid (CENTRUM) chewable tablet Chew 1 tablet daily           No discharge procedures on file      PDMP Review     None          ED Provider  Electronically Signed by           Crystal Angela MD  09/19/20 1537
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
## 2020-09-19 NOTE — LETTER
[address] [place] [id number]  Dept: [phone]    September 24, 2020     Patient: Sha Cuellar  YOB: 1955   Date of Visit: 9/19/2020       To Whom it May Concern:    Selvin Escalante is under my professional care  He was seen in the hospital from 9/19/2020   to 09/24/20  He may return to work on 9/28/2020 with the following limitations Avoid strenuous heavy lifting of the affected arm  If you have any questions or concerns, please don't hesitate to call           Sincerely,          Lior Troy MD

## 2020-09-19 NOTE — ASSESSMENT & PLAN NOTE
Lab Results   Component Value Date    HGBA1C 4 9 03/15/2019     Recent Labs     09/19/20  1850   POCGLU 108     · Diabetes mellitus prior to admission exenatide weekly    Will be held in favor sliding scale insulin during hospitalization

## 2020-09-20 LAB
ALBUMIN SERPL BCP-MCNC: 3.5 G/DL (ref 3.5–5)
ALP SERPL-CCNC: 75 U/L (ref 46–116)
ALT SERPL W P-5'-P-CCNC: 30 U/L (ref 12–78)
ANION GAP SERPL CALCULATED.3IONS-SCNC: 8 MMOL/L (ref 4–13)
AST SERPL W P-5'-P-CCNC: 15 U/L (ref 5–45)
BILIRUB SERPL-MCNC: 0.53 MG/DL (ref 0.2–1)
BUN SERPL-MCNC: 12 MG/DL (ref 5–25)
CALCIUM SERPL-MCNC: 8.8 MG/DL (ref 8.3–10.1)
CHLORIDE SERPL-SCNC: 103 MMOL/L (ref 100–108)
CO2 SERPL-SCNC: 26 MMOL/L (ref 21–32)
CREAT SERPL-MCNC: 1 MG/DL (ref 0.6–1.3)
ERYTHROCYTE [DISTWIDTH] IN BLOOD BY AUTOMATED COUNT: 13.9 % (ref 11.6–15.1)
GFR SERPL CREATININE-BSD FRML MDRD: 79 ML/MIN/1.73SQ M
GLUCOSE SERPL-MCNC: 129 MG/DL (ref 65–140)
GLUCOSE SERPL-MCNC: 153 MG/DL (ref 65–140)
GLUCOSE SERPL-MCNC: 163 MG/DL (ref 65–140)
GLUCOSE SERPL-MCNC: 178 MG/DL (ref 65–140)
GLUCOSE SERPL-MCNC: 187 MG/DL (ref 65–140)
HCT VFR BLD AUTO: 43.3 % (ref 36.5–49.3)
HGB BLD-MCNC: 13.9 G/DL (ref 12–17)
MCH RBC QN AUTO: 27.7 PG (ref 26.8–34.3)
MCHC RBC AUTO-ENTMCNC: 32.1 G/DL (ref 31.4–37.4)
MCV RBC AUTO: 86 FL (ref 82–98)
PLATELET # BLD AUTO: 159 THOUSANDS/UL (ref 149–390)
PMV BLD AUTO: 10.8 FL (ref 8.9–12.7)
POTASSIUM SERPL-SCNC: 3.8 MMOL/L (ref 3.5–5.3)
PROT SERPL-MCNC: 7.2 G/DL (ref 6.4–8.2)
RBC # BLD AUTO: 5.01 MILLION/UL (ref 3.88–5.62)
SODIUM SERPL-SCNC: 137 MMOL/L (ref 136–145)
WBC # BLD AUTO: 8.01 THOUSAND/UL (ref 4.31–10.16)

## 2020-09-20 PROCEDURE — 85027 COMPLETE CBC AUTOMATED: CPT | Performed by: INTERNAL MEDICINE

## 2020-09-20 PROCEDURE — 99222 1ST HOSP IP/OBS MODERATE 55: CPT | Performed by: ORTHOPAEDIC SURGERY

## 2020-09-20 PROCEDURE — 80053 COMPREHEN METABOLIC PANEL: CPT | Performed by: INTERNAL MEDICINE

## 2020-09-20 PROCEDURE — 82948 REAGENT STRIP/BLOOD GLUCOSE: CPT

## 2020-09-20 PROCEDURE — 99232 SBSQ HOSP IP/OBS MODERATE 35: CPT | Performed by: FAMILY MEDICINE

## 2020-09-20 RX ADMIN — KETOROLAC TROMETHAMINE 30 MG: 30 INJECTION, SOLUTION INTRAMUSCULAR at 13:12

## 2020-09-20 RX ADMIN — LISINOPRIL 40 MG: 20 TABLET ORAL at 08:13

## 2020-09-20 RX ADMIN — KETOROLAC TROMETHAMINE 30 MG: 30 INJECTION, SOLUTION INTRAMUSCULAR at 05:02

## 2020-09-20 RX ADMIN — OXYCODONE HYDROCHLORIDE 5 MG: 5 TABLET ORAL at 21:11

## 2020-09-20 RX ADMIN — Medication 1 TABLET: at 08:06

## 2020-09-20 RX ADMIN — CEFAZOLIN SODIUM 2000 MG: 2 SOLUTION INTRAVENOUS at 02:17

## 2020-09-20 RX ADMIN — HYDROMORPHONE HYDROCHLORIDE 0.5 MG: 1 INJECTION, SOLUTION INTRAMUSCULAR; INTRAVENOUS; SUBCUTANEOUS at 02:26

## 2020-09-20 RX ADMIN — TIZANIDINE 4 MG: 2 TABLET ORAL at 16:45

## 2020-09-20 RX ADMIN — INSULIN LISPRO 1 UNITS: 100 INJECTION, SOLUTION INTRAVENOUS; SUBCUTANEOUS at 08:03

## 2020-09-20 RX ADMIN — TIZANIDINE 4 MG: 2 TABLET ORAL at 21:11

## 2020-09-20 RX ADMIN — CEFAZOLIN SODIUM 2000 MG: 2 SOLUTION INTRAVENOUS at 17:50

## 2020-09-20 RX ADMIN — ASPIRIN 81 MG: 81 TABLET, COATED ORAL at 08:06

## 2020-09-20 RX ADMIN — ENOXAPARIN SODIUM 40 MG: 40 INJECTION SUBCUTANEOUS at 08:06

## 2020-09-20 RX ADMIN — TIZANIDINE 4 MG: 2 TABLET ORAL at 08:06

## 2020-09-20 RX ADMIN — INSULIN LISPRO 1 UNITS: 100 INJECTION, SOLUTION INTRAVENOUS; SUBCUTANEOUS at 21:11

## 2020-09-20 RX ADMIN — KETOROLAC TROMETHAMINE 30 MG: 30 INJECTION, SOLUTION INTRAMUSCULAR at 17:50

## 2020-09-20 RX ADMIN — CEFAZOLIN SODIUM 2000 MG: 2 SOLUTION INTRAVENOUS at 10:32

## 2020-09-20 NOTE — ASSESSMENT & PLAN NOTE
· Cellulitis of right upper extremity including elbow shoulder and chest wall  · Extensive imaging negative for thrombus or abscess  · Continue IV cefazolin for cellulitis#2  · Have orthopedics evaluate for olecranon bursitis  · Will schedule ketorolac and tizanidine for musculoskeletal component of pain

## 2020-09-20 NOTE — PROGRESS NOTES
Lung nodule seen on imaging study  Assessment & Plan  · Report from radiology 8 mm right upper lobe lung nodule    · Should have repeat imaging in 6-12 months

## 2020-09-20 NOTE — PLAN OF CARE
Problem: Potential for Falls  Goal: Patient will remain free of falls  Description: INTERVENTIONS:  - Assess patient frequently for physical needs  -  Identify cognitive and physical deficits and behaviors that affect risk of falls    -  [place] fall precautions as indicated by assessment   - Educate patient/family on patient safety including physical limitations  - Instruct patient to call for assistance with activity based on assessment  - Modify environment to reduce risk of injury  - Consider OT/PT consult to assist with strengthening/mobility  9/19/2020 2018 by Dre Alvarez RN  Outcome: Progressing  9/19/2020 2018 by Dre Alvarez RN  Outcome: Progressing     Problem: PAIN - ADULT  Goal: Verbalizes/displays adequate comfort level or baseline comfort level  Description: Interventions:  - Encourage patient to monitor pain and request assistance  - Assess pain using appropriate pain scale  - Administer analgesics based on type and severity of pain and evaluate response  - Implement non-pharmacological measures as appropriate and evaluate response  - Consider cultural and social influences on pain and pain management  - Notify physician/advanced practitioner if interventions unsuccessful or patient reports new pain  Outcome: Progressing     Problem: INFECTION - ADULT  Goal: Absence or prevention of progression during hospitalization  Description: INTERVENTIONS:  - Assess and monitor for signs and symptoms of infection  - Monitor lab/diagnostic results  - Monitor all insertion sites, i e  indwelling lines, tubes, and drains  - Monitor endotracheal if appropriate and nasal secretions for changes in amount and color  - [place] appropriate cooling/warming therapies per order  - Administer medications as ordered  - Instruct and encourage patient and family to use good hand hygiene technique  - Identify and instruct in appropriate isolation precautions for identified infection/condition  Outcome: Progressing  Goal: Absence of fever/infection during neutropenic period  Description: INTERVENTIONS:  - Monitor WBC    Outcome: Progressing     Problem: SAFETY ADULT  Goal: Patient will remain free of falls  Description: INTERVENTIONS:  - Assess patient frequently for physical needs  -  Identify cognitive and physical deficits and behaviors that affect risk of falls    -  [place] fall precautions as indicated by assessment   - Educate patient/family on patient safety including physical limitations  - Instruct patient to call for assistance with activity based on assessment  - Modify environment to reduce risk of injury  - Consider OT/PT consult to assist with strengthening/mobility  9/19/2020 2018 by Emili Rodas RN  Outcome: Progressing  9/19/2020 2018 by Emili Rodas RN  Outcome: Progressing  Goal: Maintain or return to baseline ADL function  Description: INTERVENTIONS:  -  Assess patient's ability to carry out ADLs; assess patient's baseline for ADL function and identify physical deficits which impact ability to perform ADLs (bathing, care of mouth/teeth, toileting, grooming, dressing, etc )  - Assess/evaluate cause of self-care deficits   - Assess range of motion  - Assess patient's mobility; develop plan if impaired  - Assess patient's need for assistive devices and provide as appropriate  - Encourage maximum independence but intervene and supervise when necessary  - Involve family in performance of ADLs  - Assess for home care needs following discharge   - Consider OT consult to assist with ADL evaluation and planning for discharge  - Provide patient education as appropriate  Outcome: Progressing  Goal: Maintain or return mobility status to optimal level  Description: INTERVENTIONS:  - Assess patient's baseline mobility status (ambulation, transfers, stairs, etc )    - Identify cognitive and physical deficits and behaviors that affect mobility  - Identify mobility aids required to assist with transfers and/or ambulation (gait belt, sit-to-stand, lift, walker, cane, etc )  - [place] fall precautions as indicated by assessment  - Record patient progress and toleration of activity level on Mobility SBAR; progress patient to next Phase/Stage  - Instruct patient to call for assistance with activity based on assessment  - Consider rehabilitation consult to assist with strengthening/weightbearing, etc   Outcome: Progressing     Problem: DISCHARGE PLANNING  Goal: Discharge to home or other facility with appropriate resources  Description: INTERVENTIONS:  - Identify barriers to discharge w/patient and caregiver  - Arrange for needed discharge resources and transportation as appropriate  - Identify discharge learning needs (meds, wound care, etc )  - Arrange for interpretive services to assist at discharge as needed  - Refer to Case Management Department for coordinating discharge planning if the patient needs post-hospital services based on physician/advanced practitioner order or complex needs related to functional status, cognitive ability, or social support system  Outcome: Progressing

## 2020-09-20 NOTE — PLAN OF CARE
Problem: Potential for Falls  Goal: Patient will remain free of falls  Description: INTERVENTIONS:  - Assess patient frequently for physical needs  -  Identify cognitive and physical deficits and behaviors that affect risk of falls    -  [place] fall precautions as indicated by assessment   - Educate patient/family on patient safety including physical limitations  - Instruct patient to call for assistance with activity based on assessment  - Modify environment to reduce risk of injury  - Consider OT/PT consult to assist with strengthening/mobility  Outcome: Progressing     Problem: PAIN - ADULT  Goal: Verbalizes/displays adequate comfort level or baseline comfort level  Description: Interventions:  - Encourage patient to monitor pain and request assistance  - Assess pain using appropriate pain scale  - Administer analgesics based on type and severity of pain and evaluate response  - Implement non-pharmacological measures as appropriate and evaluate response  - Consider cultural and social influences on pain and pain management  - Notify physician/advanced practitioner if interventions unsuccessful or patient reports new pain  Outcome: Progressing     Problem: INFECTION - ADULT  Goal: Absence or prevention of progression during hospitalization  Description: INTERVENTIONS:  - Assess and monitor for signs and symptoms of infection  - Monitor lab/diagnostic results  - Monitor all insertion sites, i e  indwelling lines, tubes, and drains  - Monitor endotracheal if appropriate and nasal secretions for changes in amount and color  - [place] appropriate cooling/warming therapies per order  - Administer medications as ordered  - Instruct and encourage patient and family to use good hand hygiene technique  - Identify and instruct in appropriate isolation precautions for identified infection/condition  Outcome: Progressing  Goal: Absence of fever/infection during neutropenic period  Description: INTERVENTIONS:  - Monitor WBC    Outcome: Progressing     Problem: SAFETY ADULT  Goal: Patient will remain free of falls  Description: INTERVENTIONS:  - Assess patient frequently for physical needs  -  Identify cognitive and physical deficits and behaviors that affect risk of falls    -  [place] fall precautions as indicated by assessment   - Educate patient/family on patient safety including physical limitations  - Instruct patient to call for assistance with activity based on assessment  - Modify environment to reduce risk of injury  - Consider OT/PT consult to assist with strengthening/mobility  Outcome: Progressing  Goal: Maintain or return to baseline ADL function  Description: INTERVENTIONS:  -  Assess patient's ability to carry out ADLs; assess patient's baseline for ADL function and identify physical deficits which impact ability to perform ADLs (bathing, care of mouth/teeth, toileting, grooming, dressing, etc )  - Assess/evaluate cause of self-care deficits   - Assess range of motion  - Assess patient's mobility; develop plan if impaired  - Assess patient's need for assistive devices and provide as appropriate  - Encourage maximum independence but intervene and supervise when necessary  - Involve family in performance of ADLs  - Assess for home care needs following discharge   - Consider OT consult to assist with ADL evaluation and planning for discharge  - Provide patient education as appropriate  Outcome: Progressing  Goal: Maintain or return mobility status to optimal level  Description: INTERVENTIONS:  - Assess patient's baseline mobility status (ambulation, transfers, stairs, etc )    - Identify cognitive and physical deficits and behaviors that affect mobility  - Identify mobility aids required to assist with transfers and/or ambulation (gait belt, sit-to-stand, lift, walker, cane, etc )  - [place] fall precautions as indicated by assessment  - Record patient progress and toleration of activity level on Mobility SBAR; progress patient to next Phase/Stage  - Instruct patient to call for assistance with activity based on assessment  - Consider rehabilitation consult to assist with strengthening/weightbearing, etc   Outcome: Progressing     Problem: DISCHARGE PLANNING  Goal: Discharge to home or other facility with appropriate resources  Description: INTERVENTIONS:  - Identify barriers to discharge w/patient and caregiver  - Arrange for needed discharge resources and transportation as appropriate  - Identify discharge learning needs (meds, wound care, etc )  - Arrange for interpretive services to assist at discharge as needed  - Refer to Case Management Department for coordinating discharge planning if the patient needs post-hospital services based on physician/advanced practitioner order or complex needs related to functional status, cognitive ability, or social support system  Outcome: Progressing     Problem: Knowledge Deficit  Goal: Patient/family/caregiver demonstrates understanding of disease process, treatment plan, medications, and discharge instructions  Description: Complete learning assessment and assess knowledge base    Interventions:  - Provide teaching at level of understanding  - Provide teaching via preferred learning methods  Outcome: Progressing

## 2020-09-20 NOTE — PROGRESS NOTES
Progress Note - [place] [facility]  1955, 60 y o  male MRN: [id number]    Unit/Bed#: E2 -01 Encounter: [id number]    Primary Care Provider: Paulina Calles MD   Date and time admitted to hospital: 9/19/2020 12:10 PM        * Cellulitis of right upper extremity  Assessment & Plan  · Cellulitis of right upper extremity including elbow shoulder and chest wall  · Extensive imaging negative for thrombus or abscess  · Continue IV cefazolin for cellulitis#2  · Have orthopedics evaluate for olecranon bursitis  · Will schedule ketorolac and tizanidine for musculoskeletal component of pain    Lung nodule seen on imaging study  Assessment & Plan  · Report from radiology 8 mm right upper lobe lung nodule  · Should have repeat imaging in 6-12 months    Hypertension  Assessment & Plan  · Essential hypertension continue lisinopril 40 mg daily    Diabetes mellitus [facility])  Assessment & Plan  Lab Results   Component Value Date    HGBA1C 4 9 03/15/2019     Recent Labs     09/19/20  2121 09/19/20  2345 09/20/20  0712 09/20/20  1049   POCGLU 147* 196* 163* 178*     · Diabetes mellitus prior to admission exenatide weekly  Will be held in favor sliding scale insulin during hospitalization      VTE Pharmacologic Prophylaxis:   Pharmacologic: Enoxaparin (Lovenox)  Mechanical VTE Prophylaxis in Place: Yes    Patient Centered Rounds: I have performed bedside rounds with nursing staff today  Discussions with Specialists or Other Care Team Provider:     Education and Discussions with Family / Patient: patient    Time Spent for Care: 20 minutes  More than 50% of total time spent on counseling and coordination of care as described above      Current Length of Stay: 1 day(s)    Current Patient Status: Inpatient   Certification Statement: The patient will continue to require additional inpatient hospital stay due to acute above conditions    Discharge Plan: depend on clinical course    Code Status: Level 1 - Full Code      Subjective:   I am better  No further complains    Objective:     Vitals:   Temp (24hrs), Av 3 °F (35 7 °C), Min:96 3 °F (35 7 °C), Max:96 3 °F (35 7 °C)    Temp:  [96 3 °F (35 7 °C)] 96 3 °F (35 7 °C)  HR:  [55-70] 55  Resp:  [18-19] 18  BP: (107-166)/(58-81) 107/58  SpO2:  [95 %-98 %] 97 %  Body mass index is 38 34 kg/m²  Input and Output Summary (last 24 hours): Intake/Output Summary (Last 24 hours) at 2020 1207  Last data filed at 2020 1847  Gross per 24 hour   Intake 1050 ml   Output    Net 1050 ml       Physical Exam:     Physical Exam  Constitutional:       General: He is not in acute distress  Appearance: He is not ill-appearing  Cardiovascular:      Rate and Rhythm: Tachycardia present  Pulses: Normal pulses  Heart sounds: Normal heart sounds  Pulmonary:      Effort: Pulmonary effort is normal       Breath sounds: Normal breath sounds  No stridor  No rhonchi  Abdominal:      General: Bowel sounds are normal  There is no distension  Skin:     General: Skin is warm  Additional Data:     Labs:    Results from last 7 days   Lab Units 20  0506 20  1326   WBC Thousand/uL 8 01 8 17   HEMOGLOBIN g/dL 13 9 14 8   HEMATOCRIT % 43 3 44 7   PLATELETS Thousands/uL 159 184   NEUTROS PCT %  --  65   LYMPHS PCT %  --  23   MONOS PCT %  --  9   EOS PCT %  --  3     Results from last 7 days   Lab Units 20  0506   POTASSIUM mmol/L 3 8   CHLORIDE mmol/L 103   CO2 mmol/L 26   BUN mg/dL 12   CREATININE mg/dL 1 00   CALCIUM mg/dL 8 8   ALK PHOS U/L 75   ALT U/L 30   AST U/L 15     Results from last 7 days   Lab Units 20  1326   INR  1 10       * I Have Reviewed All Lab Data Listed Above  * Additional Pertinent Lab Tests Reviewed:  All Labs Within Last 24 Hours Reviewed    Imaging:    Imaging Reports Reviewed Today Include:   Imaging Personally Reviewed by Myself Includes:      Recent Cultures (last 7 days):     Results from last 7 days   Lab Units 09/19/20  1748 09/19/20  1731   BLOOD CULTURE  Received in Microbiology Lab  Culture in Progress  Received in Microbiology Lab  Culture in Progress  Last 24 Hours Medication List:   Current Facility-Administered Medications   Medication Dose Route Frequency Provider Last Rate    acetaminophen  650 mg Oral Q6H PRN Galen Mandril, DO      aspirin  81 mg Oral Daily Galen Mandril, DO      cefazolin  2,000 mg Intravenous Q8H Jose Jovel, DO 2,000 mg (09/20/20 1032)    enoxaparin  40 mg Subcutaneous Q24H [address] Jose Med, DO      HYDROmorphone  0 5 mg Intravenous Q4H PRN Galen Mandril, DO      insulin lispro  1-6 Units Subcutaneous 4x Daily (AC & HS) Galen Mandril, DO      ketorolac  30 mg Intravenous Q6H [address] Jose Med, DO      lisinopril  40 mg Oral Daily Galen Mandril, DO      magnesium hydroxide  30 mL Oral BID PRN Galen Mandril, DO      multivitamin-minerals  1 tablet Oral Daily Jose Jovel, DO      ondansetron  4 mg Intravenous Q4H PRN Galen Mandril, DO      oxyCODONE  5 mg Oral Q4H PRN Galen Mandril, DO      sodium chloride  50 mL/hr Intravenous Continuous Jose Med, DO 50 mL/hr (09/19/20 1847)    tiZANidine  4 mg Oral TID Galen Mandril, DO          Today, Patient Was Seen By: Berenice Ortiz MD    ** Please Note: Dragon 360 Dictation voice to text software may have been used in the creation of this document   **

## 2020-09-20 NOTE — CONSULTS
Orthopaedic Surgery - Consultation  Kenny David  (63 y o  male)   : 1955   MRN: [id number]  Date: 2020   Encounter: [id number]   Unit/Bed#: E2 -01    Consulting Physician:  Justina Burr MD  Requesting Physician: Mena Ramos,*  Reason for Consult / Principal Problem: right olecranon bursitis    Assessment / Plan  Right olecranon bursitis  Neck pain referred to right arm    · Continue antibiotics per primary service  · Encourage shoulder, elbow, wrist, finger ROM  · Activity as tolerated  · Will follow    History of Present Illness  Kenny David  is a 60 y o  male who presents with 2 day history of right arm pain and swelling  The pain originates on the left side of the neck and radiates across the neck and down the right arm to the hand  He denies injury  He has been on antibiotics since admission and notes significant improvement today  This morning the most severe pain is in the posterior elbow  He has numbness in the small and ring fingers from a prior hand laceration       Review of Systems  Negative for chest pain and shortness of breath  Review of all other systems is negative    Medical, Surgical, Family, and Social History    Past Medical History:   Diagnosis Date    Diabetes mellitus ([facility] [address] )     Hypertension        Past Surgical History:   Procedure Laterality Date    HAND SURGERY      KNEE SURGERY      SPINE SURGERY         History reviewed  No pertinent family history  Social History     Occupational History    Not on file   Tobacco Use    Smoking status: Never Smoker    Smokeless tobacco: Never Used   Substance and Sexual Activity    Alcohol use:  Yes     Alcohol/week: 1 0 standard drinks     Types: 1 Cans of beer per week    Drug use: Never    Sexual activity: Not on file       Allergies   Allergen Reactions    Morphine GI Intolerance and Itching    Celecoxib Other (See Comments)     pt can't urinate      Fentanyl      Other reaction(s): dizziness      Finasteride GI Intolerance    Naproxen      Other reaction(s): worsening knee pain      Oxycodone      Other reaction(s): itching       Current Facility-Administered Medications   Medication Dose Route Frequency    acetaminophen (TYLENOL) tablet 650 mg  650 mg Oral Q6H PRN    aspirin (ECOTRIN LOW STRENGTH) EC tablet 81 mg  81 mg Oral Daily    ceFAZolin (ANCEF) IVPB (premix in dextrose) 2,000 mg 50 mL  2,000 mg Intravenous Q8H    enoxaparin (LOVENOX) subcutaneous injection 40 mg  40 mg Subcutaneous Q24H ANDREW    HYDROmorphone (DILAUDID) injection 0 5 mg  0 5 mg Intravenous Q4H PRN    insulin lispro (HumaLOG) 100 units/mL subcutaneous injection 1-6 Units  1-6 Units Subcutaneous 4x Daily (AC & HS)    ketorolac (TORADOL) injection 30 mg  30 mg Intravenous Q6H [facility]    lisinopril (ZESTRIL) tablet 40 mg  40 mg Oral Daily    magnesium hydroxide (MILK OF MAGNESIA) 400 mg/5 mL oral suspension 30 mL  30 mL Oral BID PRN    multivitamin-minerals (CENTRUM) tablet 1 tablet  1 tablet Oral Daily    ondansetron (ZOFRAN) injection 4 mg  4 mg Intravenous Q4H PRN    oxyCODONE (ROXICODONE) IR tablet 5 mg  5 mg Oral Q4H PRN    sodium chloride 0 9 % infusion  50 mL/hr Intravenous Continuous    tiZANidine (ZANAFLEX) tablet 4 mg  4 mg Oral TID     Medications Prior to Admission   Medication    aspirin (Aspirin 81) 81 mg EC tablet    Exenatide ER (Bydureon) 2 MG PEN    lisinopril (ZESTRIL) 40 mg tablet    multivitamin-iron-minerals-folic acid (CENTRUM) chewable tablet       Vitals  Temp:  [96 3 °F (35 7 °C)-97 4 °F (36 3 °C)] 96 3 °F (35 7 °C)  HR:  [55-70] 55  Resp:  [18-19] 18  BP: (107-166)/(58-81) 107/58  Body mass index is 38 34 kg/m²  I/O last 24 hours:   In: 1050 [IV Piggyback:1050]  Out: -     Physical Exam  General:  Alert & oriented x3, no distress, appears stated age  HEENT:  EOMI, eyes clear, hearing intact, mucous membranes moist  Neck:  Supple, non-tender, trachea midline, no lymphadenopathy  Cardiovascular:  Regular rate, no discernable arrhythmia  Pulmonary:  Regular rate, respirations non-labored   Abdominal:  Soft, non-tender    Ortho Exam - Right Upper Extremity  · Tenderness and mild swelling/erythema over olecranon  · Elbow ROM 30-90  · Full finger and wrist ROM  · Shoulder ROM FE-90, ER-30  · Sensation intact Ax/R/M/U nerves with absent sensation in the ring and small fingers due to previous hand injury  · 2+ radial pulse  · Limited left and right rotation of neck  · Left side paraspinal cervical muscle tenderness    Imaging  I have personally reviewed pertinent films in PACS  XR of right shoulder and elbow - no fractures or degenerative changes    Lab Results  (I have personally reviewed pertinent lab results )  Results from last 7 days   Lab Units 09/20/20  0506 09/19/20  1326   WBC Thousand/uL 8 01 8 17   HEMOGLOBIN g/dL 13 9 14 8   HEMATOCRIT % 43 3 44 7   PLATELETS Thousands/uL 159 184   RBC Million/uL 5 01 5 28   MCV fL 86 85   MCH pg 27 7 28 0   MCHC g/dL 32 1 33 1   RDW % 13 9 13 6   MPV fL 10 8 10 5   NRBC AUTO /100 WBCs  --  0     Results from last 7 days   Lab Units 09/19/20  1326   PTT seconds 34   INR  1 10     Results from last 7 days   Lab Units 09/20/20  0506 09/19/20  1326   POTASSIUM mmol/L 3 8 3 8   CHLORIDE mmol/L 103 103   CO2 mmol/L 26 28   BUN mg/dL 12 12   CREATININE mg/dL 1 00 1 05   EGFR ml/min/1 73sq m 79 75   CALCIUM mg/dL 8 8 9 0   ALT U/L 30 38   AST U/L 15 18         Results from last 7 days   Lab Units 09/19/20  1748 09/19/20  1731   BLOOD CULTURE  Received in Microbiology Lab  Culture in Progress  Received in Microbiology Lab  Culture in Progress  EKG, Pathology, and Other Studies  (I have personally reviewed pertinent reports )  na    Code Status  Level 1 - Full Code    Counseling / Coordination of Care  Total floor / unit time spent today 20 minutes   Greater than 50% of total time was spent with the patient and / or family counseling and / or coordination of care      Shay Zelaya MD

## 2020-09-20 NOTE — INCIDENTAL FINDINGS
The following findings require follow up:  Radiographic finding   Finding: There is an 8 x 7 mm nodule in the posterior right upper lobe     Follow up required:  Repeat CT scan   Follow up should be done within 6-12 month(s)    Please notify the following clinician to assist with the follow up:   Dr Talat Callahan MD

## 2020-09-20 NOTE — ASSESSMENT & PLAN NOTE
· Report from radiology 8 mm right upper lobe lung nodule    · Should have repeat imaging in 6-12 months

## 2020-09-20 NOTE — ASSESSMENT & PLAN NOTE
Lab Results   Component Value Date    HGBA1C 4 9 03/15/2019     Recent Labs     09/19/20  2121 09/19/20  2345 09/20/20  0712 09/20/20  1049   POCGLU 147* 196* 163* 178*     · Diabetes mellitus prior to admission exenatide weekly    Will be held in favor sliding scale insulin during hospitalization

## 2020-09-21 LAB
ATRIAL RATE: 59 BPM
GLUCOSE SERPL-MCNC: 126 MG/DL (ref 65–140)
GLUCOSE SERPL-MCNC: 134 MG/DL (ref 65–140)
GLUCOSE SERPL-MCNC: 140 MG/DL (ref 65–140)
GLUCOSE SERPL-MCNC: 151 MG/DL (ref 65–140)
P AXIS: 67 DEGREES
PR INTERVAL: 178 MS
QRS AXIS: -31 DEGREES
QRSD INTERVAL: 110 MS
QT INTERVAL: 416 MS
QTC INTERVAL: 411 MS
T WAVE AXIS: 26 DEGREES
VENTRICULAR RATE: 59 BPM

## 2020-09-21 PROCEDURE — 82948 REAGENT STRIP/BLOOD GLUCOSE: CPT

## 2020-09-21 PROCEDURE — 99231 SBSQ HOSP IP/OBS SF/LOW 25: CPT | Performed by: PHYSICIAN ASSISTANT

## 2020-09-21 PROCEDURE — 99232 SBSQ HOSP IP/OBS MODERATE 35: CPT | Performed by: STUDENT IN AN ORGANIZED HEALTH CARE EDUCATION/TRAINING PROGRAM

## 2020-09-21 PROCEDURE — 93010 ELECTROCARDIOGRAM REPORT: CPT | Performed by: INTERNAL MEDICINE

## 2020-09-21 RX ADMIN — SODIUM CHLORIDE 50 ML/HR: 0.9 INJECTION, SOLUTION INTRAVENOUS at 01:27

## 2020-09-21 RX ADMIN — ASPIRIN 81 MG: 81 TABLET, COATED ORAL at 08:53

## 2020-09-21 RX ADMIN — ACETAMINOPHEN 650 MG: 325 TABLET ORAL at 17:41

## 2020-09-21 RX ADMIN — CEFAZOLIN SODIUM 2000 MG: 2 SOLUTION INTRAVENOUS at 10:25

## 2020-09-21 RX ADMIN — Medication 1 TABLET: at 08:53

## 2020-09-21 RX ADMIN — KETOROLAC TROMETHAMINE 30 MG: 30 INJECTION, SOLUTION INTRAMUSCULAR at 05:53

## 2020-09-21 RX ADMIN — CEFAZOLIN SODIUM 2000 MG: 2 SOLUTION INTRAVENOUS at 17:42

## 2020-09-21 RX ADMIN — CEFAZOLIN SODIUM 2000 MG: 2 SOLUTION INTRAVENOUS at 01:27

## 2020-09-21 RX ADMIN — OXYCODONE HYDROCHLORIDE 5 MG: 5 TABLET ORAL at 23:55

## 2020-09-21 RX ADMIN — ENOXAPARIN SODIUM 40 MG: 40 INJECTION SUBCUTANEOUS at 08:55

## 2020-09-21 RX ADMIN — TIZANIDINE 4 MG: 2 TABLET ORAL at 16:46

## 2020-09-21 RX ADMIN — TIZANIDINE 4 MG: 2 TABLET ORAL at 08:53

## 2020-09-21 RX ADMIN — KETOROLAC TROMETHAMINE 30 MG: 30 INJECTION, SOLUTION INTRAMUSCULAR at 01:26

## 2020-09-21 RX ADMIN — TIZANIDINE 4 MG: 2 TABLET ORAL at 23:55

## 2020-09-21 NOTE — PROGRESS NOTES
Orthopaedic Surgery - Progress Note  Bernard Mejia  (63 y o  male)   : 1955   MRN: [id number]  Date: 2020   Encounter: [id number]   Unit/Bed#: E2 -01    Assessment / Plan  Cellulitis right upper extremity with right olecranon bursitis improving with IV antibiotics    · Continue antibiotics   · Continue to encourage range of motion of the right upper extremity including shoulder elbow wrist and finger  · Continue with activity as tolerated  · May use other modalities such as heat or ice as needed  · Ortho will continue to follow at this time while inpatient, plan continued conservative management is patient has improved significantly  Subjective  61-year-old male with right upper extremity cellulitis and olecranon bursitis which has been improving significantly over the last 2 days  He is able to move around better today though is still very sore mainly around the elbow  The pain that was originating from the neck down to the hand has been improving though he does have a baseline of discomfort from his neck with radicular to symptoms  Distally no new neurovascular changes  Vitals  Temp:  [96 4 °F (35 8 °C)-97 3 °F (36 3 °C)] 97 3 °F (36 3 °C)  HR:  [51-61] 51  Resp:  [18-20] 18  BP: (100-119)/(52-60) 101/59  Body mass index is 38 34 kg/m²  No intake/output data recorded      Ortho Exam - Right Upper Extremity  · Tenderness and mild swelling/erythema over olecranon  · Elbow ROM   · Full finger and wrist ROM  · Shoulder ROM FE-110, ER-30  · Sensation intact Ax/R/M/U nerves with absent sensation in the ring and small fingers due to previous hand injury  · 2+ radial pulse  · Limited left and right rotation of neck  · Mild Left side paraspinal cervical muscle tenderness       Lab Results  (I have personally reviewed pertinent lab results )  Results from last 7 days   Lab Units 20  0506 20  1326   WBC Thousand/uL 8 01 8 17   HEMOGLOBIN g/dL 13 9 14 8   HEMATOCRIT % 43 3 44 7   PLATELETS Thousands/uL 159 184     Results from last 7 days   Lab Units 09/19/20  1326   PTT seconds 34   INR  1 10     Results from last 7 days   Lab Units 09/20/20  0506 09/19/20  1326   POTASSIUM mmol/L 3 8 3 8   CHLORIDE mmol/L 103 103   CO2 mmol/L 26 28   BUN mg/dL 12 12   CREATININE mg/dL 1 00 1 05   EGFR ml/min/1 73sq m 79 75   CALCIUM mg/dL 8 8 9 0   ALK PHOS U/L 75 81   ALT U/L 30 38   AST U/L 15 18         Results from last 7 days   Lab Units 09/19/20  1748 09/19/20  1731   BLOOD CULTURE  No Growth at 24 hrs  No Growth at 24 hrs         Shani Costa PA-C

## 2020-09-21 NOTE — ASSESSMENT & PLAN NOTE
Lab Results   Component Value Date    HGBA1C 4 9 03/15/2019     Recent Labs     09/20/20  1049 09/20/20  1558 09/20/20  2106 09/21/20  0655   POCGLU 178* 129 187* 140     · On weekly Exenatide  IASS for now

## 2020-09-21 NOTE — ASSESSMENT & PLAN NOTE
60year old male admitted due to right upper extremity cellulitis with possible olecranon bursitis    - Continue IV antibiotic management for now  - Awaiting cultures  - Supportive management as per orthopedics

## 2020-09-21 NOTE — PROGRESS NOTES
Progress Note - Keller Starch  1955, 60 y o  male MRN: [id number]    Unit/Bed#: E2 -01 Encounter: [id number]    Primary Care Provider: Rebecca Mcginnis MD   Date and time admitted to hospital: 9/19/2020 12:10 PM        * Cellulitis of right upper extremity  Assessment & Plan  60year old male admitted due to right upper extremity cellulitis with possible olecranon bursitis  - Continue IV antibiotic management for now  - Awaiting cultures  - Supportive management as per orthopedics     Essential hypertension  Assessment & Plan  Controlled  · Continue home lisinopril while inpatient    Type 2 diabetes mellitus without complication, without long-term current use of insulin [facility])  Assessment & Plan  Lab Results   Component Value Date    HGBA1C 4 9 03/15/2019     Recent Labs     09/20/20  1049 09/20/20  1558 09/20/20  2106 09/21/20  0655   POCGLU 178* 129 187* 140     · On weekly Exenatide  IASS for now  Lung nodule seen on imaging study  Assessment & Plan  8 x 7 mm RUL nodule  - Repeat imaging in 6-12 months  VTE Pharmacologic Prophylaxis:   Pharmacologic: Enoxaparin (Lovenox)  Mechanical VTE Prophylaxis in Place: Yes    Patient Centered Rounds: I have performed bedside rounds with nursing staff today  Discussions with Specialists or Other Care Team Provider: Nursing    Education and Discussions with Family / Patient: Patient    Time Spent for Care: 30 minutes  More than 50% of total time spent on counseling and coordination of care as described above  Current Length of Stay: 2 day(s)    Current Patient Status: Inpatient   Certification Statement: The patient will continue to require additional inpatient hospital stay due to IV antibiotics, pain control, ortho eval, cultures pending    Discharge Plan: active    Code Status: Level 1 - Full Code      Subjective:   Patient seen and examined at bedside  States that the pain is much better today   Still with some limitation in ROM due to pain, but otherwise without any febrile episodes overnight  Cultures no growth to date  Objective:     Vitals:   Temp (24hrs), Av 7 °F (35 9 °C), Min:96 4 °F (35 8 °C), Max:97 3 °F (36 3 °C)    Temp:  [96 4 °F (35 8 °C)-97 3 °F (36 3 °C)] 97 3 °F (36 3 °C)  HR:  [51-61] 51  Resp:  [18-20] 18  BP: (100-119)/(52-60) 101/59  SpO2:  [95 %-99 %] 96 %  Body mass index is 38 34 kg/m²  Input and Output Summary (last 24 hours):     No intake or output data in the 24 hours ending 20 1105    Physical Exam:     Physical Exam  Vitals signs reviewed  HENT:      Head: Normocephalic  Nose: Nose normal       Mouth/Throat:      Mouth: Mucous membranes are moist    Eyes:      General: No scleral icterus  Extraocular Movements: Extraocular movements intact  Pupils: Pupils are equal, round, and reactive to light  Cardiovascular:      Rate and Rhythm: Normal rate and regular rhythm  Pulmonary:      Effort: Pulmonary effort is normal  No respiratory distress  Breath sounds: No wheezing or rales  Abdominal:      General: There is no distension  Palpations: Abdomen is soft  Tenderness: There is no abdominal tenderness  There is no guarding  Musculoskeletal:      Comments: Right elbow swelling, limitation in flexion due to pain   Skin:     General: Skin is warm  Neurological:      Mental Status: He is alert  Mental status is at baseline     Psychiatric:         Mood and Affect: Mood normal          Behavior: Behavior normal        Additional Data:     Labs:    Results from last 7 days   Lab Units 20  0506 20  1326   WBC Thousand/uL 8 01 8 17   HEMOGLOBIN g/dL 13 9 14 8   HEMATOCRIT % 43 3 44 7   PLATELETS Thousands/uL 159 184   NEUTROS PCT %  --  65   LYMPHS PCT %  --  23   MONOS PCT %  --  9   EOS PCT %  --  3     Results from last 7 days   Lab Units 20  0506   SODIUM mmol/L 137   POTASSIUM mmol/L 3 8   CHLORIDE mmol/L 103   CO2 mmol/L 26   BUN mg/dL 12 CREATININE mg/dL 1 00   ANION GAP mmol/L 8   CALCIUM mg/dL 8 8   ALBUMIN g/dL 3 5   TOTAL BILIRUBIN mg/dL 0 53   ALK PHOS U/L 75   ALT U/L 30   AST U/L 15   GLUCOSE RANDOM mg/dL 153*     Results from last 7 days   Lab Units 09/19/20  1326   INR  1 10     Results from last 7 days   Lab Units 09/21/20  0655 09/20/20  2106 09/20/20  1558 09/20/20  1049 09/20/20  0712 09/19/20  2345 09/19/20  2121 09/19/20  1850   POC GLUCOSE mg/dl 140 187* 129 178* 163* 196* 147* 108                   * I Have Reviewed All Lab Data Listed Above  * Additional Pertinent Lab Tests Reviewed: [address] Admission Reviewed    Imaging:    Imaging Reports Reviewed Today Include:  Her shoulder right, x-ray humerus right, x-ray elbow right, CT spine cervical without contrast  CTA chest with without contrast  PAS upper limb venous duplex    Recent Cultures (last 7 days):     Results from last 7 days   Lab Units 09/19/20  1748 09/19/20  1731   BLOOD CULTURE  No Growth at 24 hrs  No Growth at 24 hrs         Last 24 Hours Medication List:   Current Facility-Administered Medications   Medication Dose Route Frequency Provider Last Rate    acetaminophen  650 mg Oral Q6H PRN Nancy Ortiz, DO      aspirin  81 mg Oral Daily Nancy Ortiz, DO      cefazolin  2,000 mg Intravenous Q8H Jose Jovel, DO 2,000 mg (09/21/20 1025)    enoxaparin  40 mg Subcutaneous Q24H [address] Jose Jovel, DO      HYDROmorphone  0 5 mg Intravenous Q4H PRN Nancy Nanas, DO      insulin lispro  1-6 Units Subcutaneous 4x Daily (AC & HS) Nancy Ortiz, DO      ketorolac  30 mg Intravenous Q6H [address] Jose Jovel, DO      lisinopril  40 mg Oral Daily Nancy Diana, DO      magnesium hydroxide  30 mL Oral BID PRN Nancy Nanas, DO      multivitamin-minerals  1 tablet Oral Daily Jose Jovel, DO      ondansetron  4 mg Intravenous Q4H PRN Nancy Nanas, DO      oxyCODONE  5 mg Oral Q4H PRN Nancy Ortiz, DO      sodium chloride  50 mL/hr Intravenous Continuous Mariah Anon, DO 50 mL/hr (09/21/20 0127)    tiZANidine  4 mg Oral TID Mariah Anon, DO          Today, Patient Was Seen By: Ruslan Mooney MD    ** Please Note: Dictation voice to text software may have been used in the creation of this document   **

## 2020-09-22 LAB
GLUCOSE SERPL-MCNC: 115 MG/DL (ref 65–140)
GLUCOSE SERPL-MCNC: 154 MG/DL (ref 65–140)
GLUCOSE SERPL-MCNC: 182 MG/DL (ref 65–140)
GLUCOSE SERPL-MCNC: 186 MG/DL (ref 65–140)

## 2020-09-22 PROCEDURE — 99232 SBSQ HOSP IP/OBS MODERATE 35: CPT | Performed by: STUDENT IN AN ORGANIZED HEALTH CARE EDUCATION/TRAINING PROGRAM

## 2020-09-22 PROCEDURE — 20605 DRAIN/INJ JOINT/BURSA W/O US: CPT | Performed by: ORTHOPAEDIC SURGERY

## 2020-09-22 PROCEDURE — 82948 REAGENT STRIP/BLOOD GLUCOSE: CPT

## 2020-09-22 PROCEDURE — 99231 SBSQ HOSP IP/OBS SF/LOW 25: CPT | Performed by: ORTHOPAEDIC SURGERY

## 2020-09-22 RX ORDER — OXYCODONE HYDROCHLORIDE 5 MG/1
5 TABLET ORAL EVERY 4 HOURS PRN
Status: DISCONTINUED | OUTPATIENT
Start: 2020-09-22 | End: 2020-09-24 | Stop reason: HOSPADM

## 2020-09-22 RX ORDER — OXYCODONE HYDROCHLORIDE 10 MG/1
10 TABLET ORAL EVERY 4 HOURS PRN
Status: DISCONTINUED | OUTPATIENT
Start: 2020-09-22 | End: 2020-09-24 | Stop reason: HOSPADM

## 2020-09-22 RX ORDER — POLYETHYLENE GLYCOL 3350 17 G/17G
17 POWDER, FOR SOLUTION ORAL DAILY
Status: DISCONTINUED | OUTPATIENT
Start: 2020-09-22 | End: 2020-09-24 | Stop reason: HOSPADM

## 2020-09-22 RX ORDER — SENNOSIDES 8.6 MG
1 TABLET ORAL
Status: DISCONTINUED | OUTPATIENT
Start: 2020-09-22 | End: 2020-09-24 | Stop reason: HOSPADM

## 2020-09-22 RX ADMIN — OXYCODONE HYDROCHLORIDE 5 MG: 5 TABLET ORAL at 06:17

## 2020-09-22 RX ADMIN — ASPIRIN 81 MG: 81 TABLET, COATED ORAL at 08:17

## 2020-09-22 RX ADMIN — CEFAZOLIN SODIUM 2000 MG: 2 SOLUTION INTRAVENOUS at 10:43

## 2020-09-22 RX ADMIN — INSULIN LISPRO 1 UNITS: 100 INJECTION, SOLUTION INTRAVENOUS; SUBCUTANEOUS at 16:37

## 2020-09-22 RX ADMIN — POLYETHYLENE GLYCOL 3350 17 G: 17 POWDER, FOR SOLUTION ORAL at 12:22

## 2020-09-22 RX ADMIN — HYDROMORPHONE HYDROCHLORIDE 0.5 MG: 1 INJECTION, SOLUTION INTRAMUSCULAR; INTRAVENOUS; SUBCUTANEOUS at 02:22

## 2020-09-22 RX ADMIN — ENOXAPARIN SODIUM 40 MG: 40 INJECTION SUBCUTANEOUS at 08:17

## 2020-09-22 RX ADMIN — INSULIN LISPRO 1 UNITS: 100 INJECTION, SOLUTION INTRAVENOUS; SUBCUTANEOUS at 22:07

## 2020-09-22 RX ADMIN — OXYCODONE HYDROCHLORIDE 10 MG: 10 TABLET ORAL at 15:33

## 2020-09-22 RX ADMIN — SODIUM CHLORIDE 50 ML/HR: 0.9 INJECTION, SOLUTION INTRAVENOUS at 00:00

## 2020-09-22 RX ADMIN — OXYCODONE HYDROCHLORIDE 5 MG: 5 TABLET ORAL at 10:42

## 2020-09-22 RX ADMIN — INSULIN LISPRO 1 UNITS: 100 INJECTION, SOLUTION INTRAVENOUS; SUBCUTANEOUS at 12:22

## 2020-09-22 RX ADMIN — CEFAZOLIN SODIUM 2000 MG: 2 SOLUTION INTRAVENOUS at 18:09

## 2020-09-22 RX ADMIN — Medication 1 TABLET: at 08:17

## 2020-09-22 RX ADMIN — HYDROMORPHONE HYDROCHLORIDE 0.5 MG: 1 INJECTION, SOLUTION INTRAMUSCULAR; INTRAVENOUS; SUBCUTANEOUS at 08:18

## 2020-09-22 RX ADMIN — TIZANIDINE 4 MG: 2 TABLET ORAL at 15:37

## 2020-09-22 RX ADMIN — CEFAZOLIN SODIUM 2000 MG: 2 SOLUTION INTRAVENOUS at 02:13

## 2020-09-22 RX ADMIN — LISINOPRIL 40 MG: 20 TABLET ORAL at 08:17

## 2020-09-22 RX ADMIN — SENNOSIDES 8.6 MG: 8.6 TABLET, FILM COATED ORAL at 22:06

## 2020-09-22 RX ADMIN — TIZANIDINE 4 MG: 2 TABLET ORAL at 08:17

## 2020-09-22 RX ADMIN — OXYCODONE HYDROCHLORIDE 10 MG: 10 TABLET ORAL at 22:07

## 2020-09-22 RX ADMIN — TIZANIDINE 4 MG: 2 TABLET ORAL at 22:06

## 2020-09-22 NOTE — SOCIAL WORK
LOS: 3 GMLOS: 3 2 RISK OF READMISSION: 8 BUNDLE: NO    CM called pt's bed phone to discuss discharge needs  Pt lives in a house with his friend  ADL's are completed independently  Pt reports he has a cane but doesn't use it much  PCP identified as Dr Hayden Hernandez w/ [facility]  Pharmacy identified as [facility]  Pt does drive and states that he drove himself here  Pt reports hx of VNA and STR at [facility]  Pt has a POA but has not brought in any paperwork  CM advised him to bring it if he ever comes back to the hospital   No needs expressed or identified at this time  CM will continue to follow for discharge planning needs  CM reviewed d/c planning process including the following: identifying help at home, patient preference for d/c planning needs, [facility] Meds to Bed program, availability of treatment team to discuss questions or concerns patient and/or family may have regarding understanding medications and recognizing signs and symptoms once discharged  CM also encouraged patient to follow up with all recommended appointments after discharge  Patient advised of importance for patient and family to participate in managing patients medical well being

## 2020-09-22 NOTE — PROCEDURES
Verbal consent was obtained regarding aspiration and splinting of the right elbow  Patient site and side was identified  A time-out was performed prior to any procedure  The right elbow was then aspirated through a soft spot portal after sterile prep and drape  No return of fluid was obtained  Needle was redirected 2 times with no return of fluid  Patient tolerated the procedure well  Needle with this withdrawn and a sterile dressing was applied with pressure    We then placed a long-arm splint that was well-padded from the hand to the upper arm to help promote soft tissue rest

## 2020-09-22 NOTE — ASSESSMENT & PLAN NOTE
60year old male admitted due to right upper extremity cellulitis not likely due to septic arthritis as per orthopedics  S/p aspiration with no fluid drain   - Continue IV antibiotics  - Awaiting cultures  No growth to date    Results from last 7 days   Lab Units 09/19/20  1748 09/19/20  1731   BLOOD CULTURE  No Growth at 48 hrs  No Growth at 48 hrs

## 2020-09-22 NOTE — ASSESSMENT & PLAN NOTE
Lab Results   Component Value Date    HGBA1C 4 9 03/15/2019     Recent Labs     09/21/20  1100 09/21/20  1600 09/21/20 2037 09/22/20  0555   POCGLU 151* 134 126 115     · On weekly Exenatide  IASS for now

## 2020-09-22 NOTE — PROGRESS NOTES
Progress Note - Sherman Mello  1955, 60 y o  male MRN: [id number]    Unit/Bed#: E2 -01 Encounter: [id number]    Primary Care Provider: Cecy Payne MD   Date and time admitted to hospital: 9/19/2020 12:10 PM        * Cellulitis of right upper extremity  Assessment & Plan  60year old male admitted due to right upper extremity cellulitis not likely due to septic arthritis as per orthopedics  S/p aspiration with no fluid drain   - Continue IV antibiotics  - Awaiting cultures  No growth to date    Results from last 7 days   Lab Units 09/19/20  1748 09/19/20  1731   BLOOD CULTURE  No Growth at 48 hrs  No Growth at 48 hrs  Essential hypertension  Assessment & Plan  Controlled  · Continue home lisinopril while inpatient    Type 2 diabetes mellitus without complication, without long-term current use of insulin [facility])  Assessment & Plan  Lab Results   Component Value Date    HGBA1C 4 9 03/15/2019     Recent Labs     09/21/20  1100 09/21/20  1600 09/21/20  2037 09/22/20  0555   POCGLU 151* 134 126 115     · On weekly Exenatide  IASS for now  Lung nodule seen on imaging study  Assessment & Plan  8 x 7 mm RUL nodule  - Repeat imaging in 6-12 months  VTE Pharmacologic Prophylaxis:   Pharmacologic: Enoxaparin (Lovenox)  Mechanical VTE Prophylaxis in Place: Yes    Patient Centered Rounds: I have performed bedside rounds with nursing staff today  Discussions with Specialists or Other Care Team Provider: Nursing, ortho    Education and Discussions with Family / Patient: patient    Time Spent for Care: 30 minutes  More than 50% of total time spent on counseling and coordination of care as described above      Current Length of Stay: 3 day(s)    Current Patient Status: Inpatient   Certification Statement: The patient will continue to require additional inpatient hospital stay due to IV antibiotics, ortho reevaluation    Discharge Plan: 24-48 hours    Code Status: Level 1 - Full Code      Subjective:   Patient seen and examined at bedside  Severe pain overnight  Orthopedics evaluated him due to swelling and erythema  Drainage was performed, with no fluid aspirated  States that pain is much better right now  Objective:     Vitals:   Temp (24hrs), Av 2 °F (36 2 °C), Min:97 2 °F (36 2 °C), Max:97 3 °F (36 3 °C)    Temp:  [97 2 °F (36 2 °C)-97 3 °F (36 3 °C)] 97 3 °F (36 3 °C)  HR:  [58-60] 60  Resp:  [16-20] 16  BP: (114-153)/(56-90) 153/90  SpO2:  [98 %-100 %] 100 %  Body mass index is 38 34 kg/m²  Input and Output Summary (last 24 hours): Intake/Output Summary (Last 24 hours) at 2020 1131  Last data filed at 2020 1001  Gross per 24 hour   Intake 3206 66 ml   Output    Net 3206 66 ml       Physical Exam:     Physical Exam  Vitals signs reviewed  HENT:      Head: Normocephalic and atraumatic  Nose: Nose normal       Mouth/Throat:      Mouth: Mucous membranes are dry  Eyes:      General: No scleral icterus  Extraocular Movements: Extraocular movements intact  Pupils: Pupils are equal, round, and reactive to light  Cardiovascular:      Rate and Rhythm: Normal rate and regular rhythm  Pulmonary:      Effort: Pulmonary effort is normal  No respiratory distress  Breath sounds: No wheezing or rales  Abdominal:      General: There is no distension  Palpations: Abdomen is soft  Tenderness: There is no abdominal tenderness  There is no guarding  Musculoskeletal:         General: Swelling and tenderness present  Comments: All right upper extremity  Able to flex  Fingers  Tenderness to palpation  With erythema and warmth  Wrapped by orthopedics  Skin:     General: Skin is warm  Neurological:      General: No focal deficit present  Mental Status: He is alert  Psychiatric:         Mood and Affect: Mood normal          Behavior: Behavior normal          Thought Content:  Thought content normal          Judgment: Judgment normal        Additional Data:     Labs:    Results from last 7 days   Lab Units 09/20/20  0506 09/19/20  1326   WBC Thousand/uL 8 01 8 17   HEMOGLOBIN g/dL 13 9 14 8   HEMATOCRIT % 43 3 44 7   PLATELETS Thousands/uL 159 184   NEUTROS PCT %  --  65   LYMPHS PCT %  --  23   MONOS PCT %  --  9   EOS PCT %  --  3     Results from last 7 days   Lab Units 09/20/20  0506   SODIUM mmol/L 137   POTASSIUM mmol/L 3 8   CHLORIDE mmol/L 103   CO2 mmol/L 26   BUN mg/dL 12   CREATININE mg/dL 1 00   ANION GAP mmol/L 8   CALCIUM mg/dL 8 8   ALBUMIN g/dL 3 5   TOTAL BILIRUBIN mg/dL 0 53   ALK PHOS U/L 75   ALT U/L 30   AST U/L 15   GLUCOSE RANDOM mg/dL 153*     Results from last 7 days   Lab Units 09/19/20  1326   INR  1 10     Results from last 7 days   Lab Units 09/22/20  0555 09/21/20  2037 09/21/20  1600 09/21/20  1100 09/21/20  0655 09/20/20  2106 09/20/20  1558 09/20/20  1049 09/20/20  0712 09/19/20  2345 09/19/20  2121 09/19/20  1850   POC GLUCOSE mg/dl 115 126 134 151* 140 187* 129 178* 163* 196* 147* 108                   * I Have Reviewed All Lab Data Listed Above  * Additional Pertinent Lab Tests Reviewed: All Labs For Current Hospital Admission Reviewed    Imaging:    Imaging Reports Reviewed Today Include: No new imaging overnight  Recent Cultures (last 7 days):     Results from last 7 days   Lab Units 09/19/20  1748 09/19/20  1731   BLOOD CULTURE  No Growth at 48 hrs  No Growth at 48 hrs         Last 24 Hours Medication List:   Current Facility-Administered Medications   Medication Dose Route Frequency Provider Last Rate    acetaminophen  650 mg Oral Q6H PRN Galne Mandril, DO      aspirin  81 mg Oral Daily Galen Mandril, DO      cefazolin  2,000 mg Intravenous Q8H Jose Med, DO 2,000 mg (09/22/20 1043)    enoxaparin  40 mg Subcutaneous Q24H [address] Jose Med, DO      HYDROmorphone  0 5 mg Intravenous Q4H PRN Galen Mandril, DO      insulin lispro  1-6 Units Subcutaneous 4x Daily (AC & HS) Cherie Quintanilla Med, DO      lisinopril  40 mg Oral Daily Malva Rouleau, DO      magnesium hydroxide  30 mL Oral BID PRN Malva Rouleau, DO      multivitamin-minerals  1 tablet Oral Daily Jose Jovel, DO      ondansetron  4 mg Intravenous Q4H PRN Malva Rouleau, DO      oxyCODONE  5 mg Oral Q4H PRN Malva Rouleau, DO      sodium chloride  50 mL/hr Intravenous Continuous Malva Rouleau, DO Stopped (09/22/20 1001)    tiZANidine  4 mg Oral TID Malva Rouleau, DO          Today, Patient Was Seen By: Paty Marcelo MD    ** Please Note: Dictation voice to text software may have been used in the creation of this document   **

## 2020-09-22 NOTE — PROGRESS NOTES
Progress Note - Orthopedics   Osito Salinas  60 y o  male MRN: [id number]  Unit/Bed#: E2 -01 Encounter: [id number]    Assessment:  30-year-old male with right elbow cellulitis    Plan:  Patient was improving on antibiotics, but had increasing pain overnight  There is a questionable issue associated with IV placement in the ipsilateral arm though there does not seem to be any infiltration injury  There is essentially no swelling on the posterior aspect of the arm and he is hyperesthetic on the skin level, so I do think that this is likely more of a cellulitic picture  However, he was having pain with micro motion and given the relapse, we needed to rule out septic arthrosis  Therefore, under sterile conditions an elbow aspiration was attempted  No fluid was obtained  Nothing was sent for culture or synovial analysis given the fact that we had nothing to send  We have effectively ruled out septic elbow arthritis at this point  Would treat the cellulitis  I placed a posterior arm splint for soft tissue rest which is stay on for at least 24 hours  Continue IV antibiotics  Continue to elevate the upper extremity above the level of the heart  We will continue to follow  Subjective:  Patient had increasing pain overnight  IV placement in the ipsilateral arm  Also notes increasing numbness and tingling to the radial 3 digits which had not been an issue previous to his most recent bout of elbow pain starting this past Friday and has worsened overnight  Vitals: Blood pressure 153/90, pulse 60, temperature (!) 97 3 °F (36 3 °C), temperature source Temporal, resp  rate 16, height 5' 11" (1 803 m), weight 125 kg (274 lb 14 6 oz), SpO2 100 %  ,Body mass index is 38 34 kg/m²        Intake/Output Summary (Last 24 hours) at 9/22/2020 0948  Last data filed at 9/22/2020 0000  Gross per 24 hour   Intake 2705 83 ml   Output    Net 2705 83 ml       Invasive Devices     Peripheral Intravenous Line Peripheral IV 09/21/20 Right;Ventral (anterior) Wrist 1 day                Physical Exam:  Examination of the cervical spine reveals significant limitation of range of motion and pain  Radicular symptoms with Spurling's down to the right arm  Shoulder has tenderness to palpation globally about the joint both anterior and posteriorly and also on the greater tuberosity  Restricted range of motion in forward flexion secondary to pain  Has significant pain about the elbow with pain on micro motion  Able to actively range the elbow with about a 60 degree arc of motion from about 30-90 with significant pain  Pro supination is also very painful in the elbow  Decreased sensation in the median nerve distribution  Five 5 in the EPL, apb  Previous well-healed laceration on the palmar aspect of the hand  No sensation on the ulnar aspect  Limited finger abduction

## 2020-09-22 NOTE — PLAN OF CARE
Problem: Potential for Falls  Goal: Patient will remain free of falls  Description: INTERVENTIONS:  - Assess patient frequently for physical needs  -  Identify cognitive and physical deficits and behaviors that affect risk of falls    -  [place] fall precautions as indicated by assessment   - Educate patient/family on patient safety including physical limitations  - Instruct patient to call for assistance with activity based on assessment  - Modify environment to reduce risk of injury  - Consider OT/PT consult to assist with strengthening/mobility  Outcome: Progressing     Problem: PAIN - ADULT  Goal: Verbalizes/displays adequate comfort level or baseline comfort level  Description: Interventions:  - Encourage patient to monitor pain and request assistance  - Assess pain using appropriate pain scale  - Administer analgesics based on type and severity of pain and evaluate response  - Implement non-pharmacological measures as appropriate and evaluate response  - Consider cultural and social influences on pain and pain management  - Notify physician/advanced practitioner if interventions unsuccessful or patient reports new pain  Outcome: Progressing     Problem: INFECTION - ADULT  Goal: Absence or prevention of progression during hospitalization  Description: INTERVENTIONS:  - Assess and monitor for signs and symptoms of infection  - Monitor lab/diagnostic results  - Monitor all insertion sites, i e  indwelling lines, tubes, and drains  - Monitor endotracheal if appropriate and nasal secretions for changes in amount and color  - [place] appropriate cooling/warming therapies per order  - Administer medications as ordered  - Instruct and encourage patient and family to use good hand hygiene technique  - Identify and instruct in appropriate isolation precautions for identified infection/condition  Outcome: Progressing  Goal: Absence of fever/infection during neutropenic period  Description: INTERVENTIONS:  - Monitor WBC    Outcome: Progressing     Problem: SAFETY ADULT  Goal: Patient will remain free of falls  Description: INTERVENTIONS:  - Assess patient frequently for physical needs  -  Identify cognitive and physical deficits and behaviors that affect risk of falls    -  [place] fall precautions as indicated by assessment   - Educate patient/family on patient safety including physical limitations  - Instruct patient to call for assistance with activity based on assessment  - Modify environment to reduce risk of injury  - Consider OT/PT consult to assist with strengthening/mobility  Outcome: Progressing  Goal: Maintain or return to baseline ADL function  Description: INTERVENTIONS:  -  Assess patient's ability to carry out ADLs; assess patient's baseline for ADL function and identify physical deficits which impact ability to perform ADLs (bathing, care of mouth/teeth, toileting, grooming, dressing, etc )  - Assess/evaluate cause of self-care deficits   - Assess range of motion  - Assess patient's mobility; develop plan if impaired  - Assess patient's need for assistive devices and provide as appropriate  - Encourage maximum independence but intervene and supervise when necessary  - Involve family in performance of ADLs  - Assess for home care needs following discharge   - Consider OT consult to assist with ADL evaluation and planning for discharge  - Provide patient education as appropriate  Outcome: Progressing  Goal: Maintain or return mobility status to optimal level  Description: INTERVENTIONS:  - Assess patient's baseline mobility status (ambulation, transfers, stairs, etc )    - Identify cognitive and physical deficits and behaviors that affect mobility  - Identify mobility aids required to assist with transfers and/or ambulation (gait belt, sit-to-stand, lift, walker, cane, etc )  - [place] fall precautions as indicated by assessment  - Record patient progress and toleration of activity level on Mobility SBAR; progress patient to next Phase/Stage  - Instruct patient to call for assistance with activity based on assessment  - Consider rehabilitation consult to assist with strengthening/weightbearing, etc   Outcome: Progressing     Problem: DISCHARGE PLANNING  Goal: Discharge to home or other facility with appropriate resources  Description: INTERVENTIONS:  - Identify barriers to discharge w/patient and caregiver  - Arrange for needed discharge resources and transportation as appropriate  - Identify discharge learning needs (meds, wound care, etc )  - Arrange for interpretive services to assist at discharge as needed  - Refer to Case Management Department for coordinating discharge planning if the patient needs post-hospital services based on physician/advanced practitioner order or complex needs related to functional status, cognitive ability, or social support system  Outcome: Progressing     Problem: Knowledge Deficit  Goal: Patient/family/caregiver demonstrates understanding of disease process, treatment plan, medications, and discharge instructions  Description: Complete learning assessment and assess knowledge base    Interventions:  - Provide teaching at level of understanding  - Provide teaching via preferred learning methods  Outcome: Progressing

## 2020-09-22 NOTE — PLAN OF CARE
Problem: SAFETY ADULT  Goal: Maintain or return to baseline ADL function  Description: INTERVENTIONS:  -  Assess patient's ability to carry out ADLs; assess patient's baseline for ADL function and identify physical deficits which impact ability to perform ADLs (bathing, care of mouth/teeth, toileting, grooming, dressing, etc )  - Assess/evaluate cause of self-care deficits   - Assess range of motion  - Assess patient's mobility; develop plan if impaired  - Assess patient's need for assistive devices and provide as appropriate  - Encourage maximum independence but intervene and supervise when necessary  - Involve family in performance of ADLs  - Assess for home care needs following discharge   - Consider OT consult to assist with ADL evaluation and planning for discharge  - Provide patient education as appropriate  Outcome: Progressing  Goal: Maintain or return mobility status to optimal level  Description: INTERVENTIONS:  - Assess patient's baseline mobility status (ambulation, transfers, stairs, etc )    - Identify cognitive and physical deficits and behaviors that affect mobility  - Identify mobility aids required to assist with transfers and/or ambulation (gait belt, sit-to-stand, lift, walker, cane, etc )  - [place] fall precautions as indicated by assessment  - Record patient progress and toleration of activity level on Mobility SBAR; progress patient to next Phase/Stage  - Instruct patient to call for assistance with activity based on assessment  - Consider rehabilitation consult to assist with strengthening/weightbearing, etc   Outcome: Progressing     Problem: DISCHARGE PLANNING  Goal: Discharge to home or other facility with appropriate resources  Description: INTERVENTIONS:  - Identify barriers to discharge w/patient and caregiver  - Arrange for needed discharge resources and transportation as appropriate  - Identify discharge learning needs (meds, wound care, etc )  - Arrange for interpretive services to assist at discharge as needed  - Refer to Case Management Department for coordinating discharge planning if the patient needs post-hospital services based on physician/advanced practitioner order or complex needs related to functional status, cognitive ability, or social support system  Outcome: Progressing     Problem: Knowledge Deficit  Goal: Patient/family/caregiver demonstrates understanding of disease process, treatment plan, medications, and discharge instructions  Description: Complete learning assessment and assess knowledge base    Interventions:  - Provide teaching at level of understanding  - Provide teaching via preferred learning methods  Outcome: Progressing

## 2020-09-23 ENCOUNTER — APPOINTMENT (INPATIENT)
Dept: CT IMAGING | Facility: HOSPITAL | Age: 65
DRG: 558 | End: 2020-09-23
Payer: MEDICARE

## 2020-09-23 LAB
BASOPHILS # BLD AUTO: 0.02 THOUSANDS/ΜL (ref 0–0.1)
BASOPHILS NFR BLD AUTO: 0 % (ref 0–1)
EOSINOPHIL # BLD AUTO: 0.2 THOUSAND/ΜL (ref 0–0.61)
EOSINOPHIL NFR BLD AUTO: 4 % (ref 0–6)
ERYTHROCYTE [DISTWIDTH] IN BLOOD BY AUTOMATED COUNT: 13.8 % (ref 11.6–15.1)
GLUCOSE SERPL-MCNC: 117 MG/DL (ref 65–140)
GLUCOSE SERPL-MCNC: 147 MG/DL (ref 65–140)
GLUCOSE SERPL-MCNC: 149 MG/DL (ref 65–140)
GLUCOSE SERPL-MCNC: 156 MG/DL (ref 65–140)
HCT VFR BLD AUTO: 39.7 % (ref 36.5–49.3)
HGB BLD-MCNC: 12.6 G/DL (ref 12–17)
IMM GRANULOCYTES # BLD AUTO: 0.02 THOUSAND/UL (ref 0–0.2)
IMM GRANULOCYTES NFR BLD AUTO: 0 % (ref 0–2)
LYMPHOCYTES # BLD AUTO: 1.5 THOUSANDS/ΜL (ref 0.6–4.47)
LYMPHOCYTES NFR BLD AUTO: 30 % (ref 14–44)
MCH RBC QN AUTO: 27.6 PG (ref 26.8–34.3)
MCHC RBC AUTO-ENTMCNC: 31.7 G/DL (ref 31.4–37.4)
MCV RBC AUTO: 87 FL (ref 82–98)
MONOCYTES # BLD AUTO: 0.57 THOUSAND/ΜL (ref 0.17–1.22)
MONOCYTES NFR BLD AUTO: 11 % (ref 4–12)
NEUTROPHILS # BLD AUTO: 2.78 THOUSANDS/ΜL (ref 1.85–7.62)
NEUTS SEG NFR BLD AUTO: 55 % (ref 43–75)
NRBC BLD AUTO-RTO: 0 /100 WBCS
PLATELET # BLD AUTO: 161 THOUSANDS/UL (ref 149–390)
PMV BLD AUTO: 11 FL (ref 8.9–12.7)
PROCALCITONIN SERPL-MCNC: <0.05 NG/ML
RBC # BLD AUTO: 4.56 MILLION/UL (ref 3.88–5.62)
WBC # BLD AUTO: 5.09 THOUSAND/UL (ref 4.31–10.16)

## 2020-09-23 PROCEDURE — 99231 SBSQ HOSP IP/OBS SF/LOW 25: CPT | Performed by: PHYSICIAN ASSISTANT

## 2020-09-23 PROCEDURE — 84145 PROCALCITONIN (PCT): CPT | Performed by: STUDENT IN AN ORGANIZED HEALTH CARE EDUCATION/TRAINING PROGRAM

## 2020-09-23 PROCEDURE — 85025 COMPLETE CBC W/AUTO DIFF WBC: CPT | Performed by: STUDENT IN AN ORGANIZED HEALTH CARE EDUCATION/TRAINING PROGRAM

## 2020-09-23 PROCEDURE — 99232 SBSQ HOSP IP/OBS MODERATE 35: CPT | Performed by: STUDENT IN AN ORGANIZED HEALTH CARE EDUCATION/TRAINING PROGRAM

## 2020-09-23 PROCEDURE — 99223 1ST HOSP IP/OBS HIGH 75: CPT | Performed by: INTERNAL MEDICINE

## 2020-09-23 PROCEDURE — 73201 CT UPPER EXTREMITY W/DYE: CPT

## 2020-09-23 PROCEDURE — 82948 REAGENT STRIP/BLOOD GLUCOSE: CPT

## 2020-09-23 RX ADMIN — ASPIRIN 81 MG: 81 TABLET, COATED ORAL at 10:33

## 2020-09-23 RX ADMIN — LISINOPRIL 40 MG: 20 TABLET ORAL at 10:33

## 2020-09-23 RX ADMIN — OXYCODONE HYDROCHLORIDE 5 MG: 5 TABLET ORAL at 21:43

## 2020-09-23 RX ADMIN — TIZANIDINE 4 MG: 2 TABLET ORAL at 21:43

## 2020-09-23 RX ADMIN — Medication 1 TABLET: at 10:33

## 2020-09-23 RX ADMIN — CEFAZOLIN SODIUM 2000 MG: 2 SOLUTION INTRAVENOUS at 10:34

## 2020-09-23 RX ADMIN — TIZANIDINE 4 MG: 2 TABLET ORAL at 10:33

## 2020-09-23 RX ADMIN — ENOXAPARIN SODIUM 40 MG: 40 INJECTION SUBCUTANEOUS at 10:33

## 2020-09-23 RX ADMIN — CEFAZOLIN SODIUM 2000 MG: 2 SOLUTION INTRAVENOUS at 02:22

## 2020-09-23 RX ADMIN — TIZANIDINE 4 MG: 2 TABLET ORAL at 17:05

## 2020-09-23 RX ADMIN — ACETAMINOPHEN 650 MG: 325 TABLET ORAL at 11:47

## 2020-09-23 RX ADMIN — CEFAZOLIN SODIUM 2000 MG: 2 SOLUTION INTRAVENOUS at 17:06

## 2020-09-23 RX ADMIN — SENNOSIDES 8.6 MG: 8.6 TABLET, FILM COATED ORAL at 21:43

## 2020-09-23 RX ADMIN — OXYCODONE HYDROCHLORIDE 10 MG: 10 TABLET ORAL at 02:22

## 2020-09-23 RX ADMIN — INSULIN LISPRO 1 UNITS: 100 INJECTION, SOLUTION INTRAVENOUS; SUBCUTANEOUS at 21:43

## 2020-09-23 NOTE — CASE MANAGEMENT
This case management assistant (CMA) met with the patient and reviewed his medicare rights with him  He did his best to sign the [facility] signature page for this CMA

## 2020-09-23 NOTE — PROGRESS NOTES
Orthopaedic Surgery - Progress Note  Star Began  (63 y o  male)   : 1955   MRN: [id number]  Date: 2020   Encounter: [id number]   Unit/Bed#: E2 -01    Assessment / Plan  Cellulitis right elbow improving with IV antibiotics    · Continue antibiotics per ID recommendations  · Increase in pain and swelling yesterday possibly due to IV fluids in the affected arm as the patient has improved significantly since the fluids were stopped and IV was switched to the left arm yesterday  · Continue splint at this time  Patient instructed to continue with splint for at least 2 more days to help reduce irritation  He can remove splint to re-evaluate swelling and redness daily and reapply with Webril, the splint and then Ace wrap over top  I did discuss elbow range-of-motion hand range-of-motion exercises for home with the patient once he is out of the splint  · Continue with frequent elevation of the right arm and analgesics as needed  · May use other modalities such as heat or ice as needed  · Ortho will continue to follow at this time while inpatient, from orthopedic standpoint can be discharged home on p o  antibiotics when ID feels is acceptable as the patient is continuing to improve  · Once discharge plan outpatient follow-up in 1 week with Dr David Armstrong for re-evaluation  Subjective  23-year-old male with right upper extremity cellulitis and olecranon bursitis which did improve initially until yesterday when he had increased swelling, pain and erythema after an IV with fluids was placed in the affected arm  Since discontinuing the fluid, switching the IV and placing the patient in a splint he has had significant improvement in his pain and swelling  He is able to move around much better today than yesterday though is still very sore mainly around the elbow    The pain that was originating from the neck down to the hand has been improving though he does have a baseline of discomfort from his neck with radicular to symptoms  Distally no new neurovascular changes  Vitals  Temp:  [96 8 °F (36 °C)-97 4 °F (36 3 °C)] 97 4 °F (36 3 °C)  HR:  [50-64] 54  Resp:  [16] 16  BP: (120-137)/(63-75) 120/63 splint  Body mass index is 38 34 kg/m²  I/O last 24 hours: In: 720 8 [P O :220; I V :500 8]  Out: -     Ortho Exam - Right Upper Extremity  · Tenderness and mild swelling/erythema just distal to the olecranon laterally but improved significantly since yesterday  Swelling has resolved in the right hand and wrist   · Elbow ROM   · Full finger and wrist ROM today  · Sensation intact Ax/R/M/U nerves with absent sensation in the ring and small fingers due to previous hand injury  · 2+ radial pulse  · Limited left and right rotation of neck  · Mild Left side paraspinal cervical muscle tenderness       Lab Results  (I have personally reviewed pertinent lab results )  Results from last 7 days   Lab Units 09/23/20  0434 09/20/20  0506 09/19/20  1326   WBC Thousand/uL 5 09 8 01 8 17   HEMOGLOBIN g/dL 12 6 13 9 14 8   HEMATOCRIT % 39 7 43 3 44 7   PLATELETS Thousands/uL 161 159 184     Results from last 7 days   Lab Units 09/19/20  1326   PTT seconds 34   INR  1 10     Results from last 7 days   Lab Units 09/20/20  0506 09/19/20  1326   POTASSIUM mmol/L 3 8 3 8   CHLORIDE mmol/L 103 103   CO2 mmol/L 26 28   BUN mg/dL 12 12   CREATININE mg/dL 1 00 1 05   EGFR ml/min/1 73sq m 79 75   CALCIUM mg/dL 8 8 9 0   ALK PHOS U/L 75 81   ALT U/L 30 38   AST U/L 15 18         Results from last 7 days   Lab Units 09/19/20  1748 09/19/20  1731   BLOOD CULTURE  No Growth at 72 hrs  No Growth at 72 hrs         Romel Mejia PA-C

## 2020-09-23 NOTE — ASSESSMENT & PLAN NOTE
60year old male admitted due to right upper extremity cellulitis not likely due to septic arthritis as per orthopedics  S/p aspiration with no fluid drain   - Continue IV antibiotics  - Spoke to infectious disease, requesting imaging studies  - Cultures  No growth to date    Results from last 7 days   Lab Units 09/19/20  1748 09/19/20  1731   BLOOD CULTURE  No Growth at 72 hrs  No Growth at 72 hrs

## 2020-09-23 NOTE — DISCHARGE INSTRUCTIONS
Orthopedics      SWELLING CONTROL:  · Cold Therapy:  Apply ice (20 min on, 20 min off) as often as you feel is necessary  · Elevation:  Keep your arm at or above heart level as much as possible  IMMOBILIZATION:  Continue with elbow splint for at least 2 more days depending on swelling and pain  May remove splint then reapply for evaluation daily  May start range of motion of the elbow as tolerated once splint is removed  ACTIVITY:  · DO NOT lift, carry, push, or pull anything with your arm until cleared by your surgeon  · Wrist / Finger Motion:  Move your wrist and fingers (if not immobilized in splint) through a full range of motion 20 times per hour while awake        FOLLOW-UP APPOINTMENT:  · 1 week with:    Dr [facility]  [address] [place] [address], [address] [place] [address], [address] [facility]  [phone] ([facility] [address])  [phone] (After Hours)

## 2020-09-23 NOTE — PROGRESS NOTES
Progress Note - Kandi Portillo  1955, 60 y o  male MRN: [id number]    Unit/Bed#: E2 -01 Encounter: [id number]    Primary Care Provider: Sanjay De La Cruz MD   Date and time admitted to hospital: 9/19/2020 12:10 PM        * Cellulitis of right upper extremity  Assessment & Plan  60year old male admitted due to right upper extremity cellulitis not likely due to septic arthritis as per orthopedics  S/p aspiration with no fluid drain   - Continue IV antibiotics  - Spoke to infectious disease, requesting imaging studies  - Cultures  No growth to date    Results from last 7 days   Lab Units 09/19/20  1748 09/19/20  1731   BLOOD CULTURE  No Growth at 72 hrs  No Growth at 72 hrs  Essential hypertension  Assessment & Plan  Controlled  · Continue home lisinopril while inpatient    Type 2 diabetes mellitus without complication, without long-term current use of insulin [facility])  Assessment & Plan  Lab Results   Component Value Date    HGBA1C 4 9 03/15/2019     Recent Labs     09/22/20  1623 09/22/20  2123 09/23/20  0621 09/23/20  1115   POCGLU 186* 154* 117 149*     · On weekly Exenatide  IASS for now  Lung nodule seen on imaging study  Assessment & Plan  8 x 7 mm RUL nodule  - Repeat imaging in 6-12 months  VTE Pharmacologic Prophylaxis:   Pharmacologic: Enoxaparin (Lovenox)  Mechanical VTE Prophylaxis in Place: Yes    Patient Centered Rounds: I have performed bedside rounds with nursing staff today  Discussions with Specialists or Other Care Team Provider: Nursing    Education and Discussions with Family / Patient: patient, ID    Time Spent for Care: 30 minutes  More than 50% of total time spent on counseling and coordination of care as described above      Current Length of Stay: 4 day(s)    Current Patient Status: Inpatient   Certification Statement: The patient will continue to require additional inpatient hospital stay due to IV antibiotics, ct upper extremity, id eval    Discharge Plan:  horus    Code Status: Level 1 - Full Code      Subjective:   Patient seen and examined at bedside  Still with pain  Subjectively he feels better  However, he has been getting oxycodone for severe pain which may be masking his current condition  Discussed his case with ID who recommended imaging  Objective:     Vitals:   Temp (24hrs), Av 2 °F (36 2 °C), Min:96 8 °F (36 °C), Max:97 4 °F (36 3 °C)    Temp:  [96 8 °F (36 °C)-97 4 °F (36 3 °C)] 97 4 °F (36 3 °C)  HR:  [50-64] 54  Resp:  [16] 16  BP: (120-137)/(63-75) 120/63  SpO2:  [97 %-100 %] 97 %  Body mass index is 38 34 kg/m²  Input and Output Summary (last 24 hours): Intake/Output Summary (Last 24 hours) at 2020 1146  Last data filed at 2020 0745  Gross per 24 hour   Intake 220 ml   Output    Net 220 ml       Physical Exam:     Physical Exam  Vitals signs reviewed  HENT:      Head: Normocephalic  Nose: Nose normal       Mouth/Throat:      Mouth: Mucous membranes are moist    Eyes:      General: No scleral icterus  Neck:      Musculoskeletal: Normal range of motion  Cardiovascular:      Rate and Rhythm: Normal rate  Pulmonary:      Effort: Pulmonary effort is normal  No respiratory distress  Breath sounds: No wheezing  Abdominal:      General: There is no distension  Palpations: Abdomen is soft  Tenderness: There is no abdominal tenderness  Musculoskeletal:         General: Swelling and tenderness present  Comments: Limitation ROM R elbow due to pain   Skin:     General: Skin is warm  Neurological:      Mental Status: He is alert  Mental status is at baseline     Psychiatric:         Mood and Affect: Mood normal          Behavior: Behavior normal        Additional Data:     Labs:    Results from last 7 days   Lab Units 20  0434   WBC Thousand/uL 5 09   HEMOGLOBIN g/dL 12 6   HEMATOCRIT % 39 7   PLATELETS Thousands/uL 161   NEUTROS PCT % 55   LYMPHS PCT % 30   MONOS PCT % 11   EOS PCT % 4     Results from last 7 days   Lab Units 09/20/20  0506   SODIUM mmol/L 137   POTASSIUM mmol/L 3 8   CHLORIDE mmol/L 103   CO2 mmol/L 26   BUN mg/dL 12   CREATININE mg/dL 1 00   ANION GAP mmol/L 8   CALCIUM mg/dL 8 8   ALBUMIN g/dL 3 5   TOTAL BILIRUBIN mg/dL 0 53   ALK PHOS U/L 75   ALT U/L 30   AST U/L 15   GLUCOSE RANDOM mg/dL 153*     Results from last 7 days   Lab Units 09/19/20  1326   INR  1 10     Results from last 7 days   Lab Units 09/23/20  1115 09/23/20  0621 09/22/20  2123 09/22/20  1623 09/22/20  1115 09/22/20  0555 09/21/20  2037 09/21/20  1600 09/21/20  1100 09/21/20  0655 09/20/20  2106 09/20/20  1558   POC GLUCOSE mg/dl 149* 117 154* 186* 182* 115 126 134 151* 140 187* 129                   * I Have Reviewed All Lab Data Listed Above  * Additional Pertinent Lab Tests Reviewed: Aleida 66 Admission Reviewed    Imaging:    Imaging Reports Reviewed Today Include: No new imaging    Recent Cultures (last 7 days):     Results from last 7 days   Lab Units 09/19/20  1748 09/19/20  1731   BLOOD CULTURE  No Growth at 72 hrs  No Growth at 72 hrs         Last 24 Hours Medication List:   Current Facility-Administered Medications   Medication Dose Route Frequency Provider Last Rate    acetaminophen  650 mg Oral Q6H PRN Inwood Sinner, DO      aspirin  81 mg Oral Daily Leslie Herron, DO      cefazolin  2,000 mg Intravenous Q8H Jose Jovel, DO 2,000 mg (09/23/20 1034)    enoxaparin  40 mg Subcutaneous Q24H [facility] Jose Jovel, DO      HYDROmorphone  0 5 mg Intravenous Q4H PRN Inwood Sinner, DO      insulin lispro  1-6 Units Subcutaneous 4x Daily (AC & HS) Jose Jovel DO      lisinopril  40 mg Oral Daily Jose Jovel, DO      magnesium hydroxide  30 mL Oral BID PRN Leslie Sinner, DO      multivitamin-minerals  1 tablet Oral Daily Jose Jovel, DO      ondansetron  4 mg Intravenous Q4H PRN Inwood Sinner, DO      oxyCODONE  5 mg Oral Q4H PRN Bart Rios MD Or    oxyCODONE  10 mg Oral Q4H PRN Jennye Babinski, MD      polyethylene glycol  17 g Oral Daily Jennye Babinski, MD      senna  1 tablet Oral HS Jennye Babinski, MD      tiZANidine  4 mg Oral TID Lavon Diaz DO          Today, Patient Was Seen By: Kendy Melgar MD    ** Please Note: Dictation voice to text software may have been used in the creation of this document   **

## 2020-09-23 NOTE — PLAN OF CARE
Problem: Potential for Falls  Goal: Patient will remain free of falls  Description: INTERVENTIONS:  - Assess patient frequently for physical needs  -  Identify cognitive and physical deficits and behaviors that affect risk of falls    -  [place] fall precautions as indicated by assessment   - Educate patient/family on patient safety including physical limitations  - Instruct patient to call for assistance with activity based on assessment  - Modify environment to reduce risk of injury  - Consider OT/PT consult to assist with strengthening/mobility  Outcome: Progressing     Problem: PAIN - ADULT  Goal: Verbalizes/displays adequate comfort level or baseline comfort level  Description: Interventions:  - Encourage patient to monitor pain and request assistance  - Assess pain using appropriate pain scale  - Administer analgesics based on type and severity of pain and evaluate response  - Implement non-pharmacological measures as appropriate and evaluate response  - Consider cultural and social influences on pain and pain management  - Notify physician/advanced practitioner if interventions unsuccessful or patient reports new pain  Outcome: Progressing     Problem: INFECTION - ADULT  Goal: Absence or prevention of progression during hospitalization  Description: INTERVENTIONS:  - Assess and monitor for signs and symptoms of infection  - Monitor lab/diagnostic results  - Monitor all insertion sites, i e  indwelling lines, tubes, and drains  - Monitor endotracheal if appropriate and nasal secretions for changes in amount and color  - [place] appropriate cooling/warming therapies per order  - Administer medications as ordered  - Instruct and encourage patient and family to use good hand hygiene technique  - Identify and instruct in appropriate isolation precautions for identified infection/condition  Outcome: Progressing     Problem: SAFETY ADULT  Goal: Patient will remain free of falls  Description: INTERVENTIONS:  - Assess patient frequently for physical needs  -  Identify cognitive and physical deficits and behaviors that affect risk of falls    -  [place] fall precautions as indicated by assessment   - Educate patient/family on patient safety including physical limitations  - Instruct patient to call for assistance with activity based on assessment  - Modify environment to reduce risk of injury  - Consider OT/PT consult to assist with strengthening/mobility  Outcome: Progressing  Goal: Maintain or return to baseline ADL function  Description: INTERVENTIONS:  -  Assess patient's ability to carry out ADLs; assess patient's baseline for ADL function and identify physical deficits which impact ability to perform ADLs (bathing, care of mouth/teeth, toileting, grooming, dressing, etc )  - Assess/evaluate cause of self-care deficits   - Assess range of motion  - Assess patient's mobility; develop plan if impaired  - Assess patient's need for assistive devices and provide as appropriate  - Encourage maximum independence but intervene and supervise when necessary  - Involve family in performance of ADLs  - Assess for home care needs following discharge   - Consider OT consult to assist with ADL evaluation and planning for discharge  - Provide patient education as appropriate  Outcome: Progressing  Goal: Maintain or return mobility status to optimal level  Description: INTERVENTIONS:  - Assess patient's baseline mobility status (ambulation, transfers, stairs, etc )    - Identify cognitive and physical deficits and behaviors that affect mobility  - Identify mobility aids required to assist with transfers and/or ambulation (gait belt, sit-to-stand, lift, walker, cane, etc )  - [place] fall precautions as indicated by assessment  - Record patient progress and toleration of activity level on Mobility SBAR; progress patient to next Phase/Stage  - Instruct patient to call for assistance with activity based on assessment  - Consider rehabilitation consult to assist with strengthening/weightbearing, etc   Outcome: Progressing     Problem: DISCHARGE PLANNING  Goal: Discharge to home or other facility with appropriate resources  Description: INTERVENTIONS:  - Identify barriers to discharge w/patient and caregiver  - Arrange for needed discharge resources and transportation as appropriate  - Identify discharge learning needs (meds, wound care, etc )  - Refer to Case Management Department for coordinating discharge planning if the patient needs post-hospital services based on physician/advanced practitioner order or complex needs related to functional status, cognitive ability, or social support system  Outcome: Progressing     Problem: Knowledge Deficit  Goal: Patient/family/caregiver demonstrates understanding of disease process, treatment plan, medications, and discharge instructions  Description: Complete learning assessment and assess knowledge base    Interventions:  - Provide teaching at level of understanding  - Provide teaching via preferred learning methods  Outcome: Progressing     Problem: MUSCULOSKELETAL - ADULT  Goal: Maintain or return mobility to safest level of function  Description: INTERVENTIONS:  - Assess patient's ability to carry out ADLs; assess patient's baseline for ADL function and identify physical deficits which impact ability to perform ADLs (bathing, care of mouth/teeth, toileting, grooming, dressing, etc )  - Assess/evaluate cause of self-care deficits   - Assess range of motion  - Assess patient's mobility  - Assess patient's need for assistive devices and provide as appropriate  - Encourage maximum independence but intervene and supervise when necessary  - Involve family in performance of ADLs  - Assess for home care needs following discharge   - Consider OT consult to assist with ADL evaluation and planning for discharge  - Provide patient education as appropriate  Outcome: Progressing  Goal: Maintain proper alignment of affected body part  Description: INTERVENTIONS:  - Support, maintain and protect limb and body alignment  - Provide patient/ family with appropriate education  Outcome: Progressing

## 2020-09-23 NOTE — ASSESSMENT & PLAN NOTE
Lab Results   Component Value Date    HGBA1C 4 9 03/15/2019     Recent Labs     09/22/20  1623 09/22/20  2123 09/23/20  0621 09/23/20  1115   POCGLU 186* 154* 117 149*     · On weekly Exenatide  IASS for now

## 2020-09-24 VITALS
WEIGHT: 274.91 LBS | BODY MASS INDEX: 38.49 KG/M2 | DIASTOLIC BLOOD PRESSURE: 78 MMHG | RESPIRATION RATE: 18 BRPM | HEIGHT: 71 IN | OXYGEN SATURATION: 100 % | HEART RATE: 50 BPM | TEMPERATURE: 97.1 F | SYSTOLIC BLOOD PRESSURE: 128 MMHG

## 2020-09-24 LAB
GLUCOSE SERPL-MCNC: 106 MG/DL (ref 65–140)
GLUCOSE SERPL-MCNC: 140 MG/DL (ref 65–140)

## 2020-09-24 PROCEDURE — 99239 HOSP IP/OBS DSCHRG MGMT >30: CPT | Performed by: STUDENT IN AN ORGANIZED HEALTH CARE EDUCATION/TRAINING PROGRAM

## 2020-09-24 PROCEDURE — 99231 SBSQ HOSP IP/OBS SF/LOW 25: CPT | Performed by: PHYSICIAN ASSISTANT

## 2020-09-24 PROCEDURE — 82948 REAGENT STRIP/BLOOD GLUCOSE: CPT

## 2020-09-24 PROCEDURE — 99232 SBSQ HOSP IP/OBS MODERATE 35: CPT | Performed by: INTERNAL MEDICINE

## 2020-09-24 RX ORDER — CEPHALEXIN 500 MG/1
500 CAPSULE ORAL EVERY 6 HOURS SCHEDULED
Qty: 32 CAPSULE | Refills: 0 | Status: SHIPPED | OUTPATIENT
Start: 2020-09-24 | End: 2020-10-02

## 2020-09-24 RX ORDER — OXYCODONE HYDROCHLORIDE 10 MG/1
10 TABLET ORAL EVERY 6 HOURS PRN
Qty: 12 TABLET | Refills: 0 | Status: SHIPPED | OUTPATIENT
Start: 2020-09-24 | End: 2020-09-27

## 2020-09-24 RX ORDER — CEPHALEXIN 500 MG/1
500 CAPSULE ORAL EVERY 6 HOURS SCHEDULED
Status: DISCONTINUED | OUTPATIENT
Start: 2020-09-24 | End: 2020-09-24 | Stop reason: HOSPADM

## 2020-09-24 RX ADMIN — POLYETHYLENE GLYCOL 3350 17 G: 17 POWDER, FOR SOLUTION ORAL at 09:47

## 2020-09-24 RX ADMIN — ENOXAPARIN SODIUM 40 MG: 40 INJECTION SUBCUTANEOUS at 09:46

## 2020-09-24 RX ADMIN — CEFAZOLIN SODIUM 2000 MG: 2 SOLUTION INTRAVENOUS at 02:39

## 2020-09-24 RX ADMIN — ASPIRIN 81 MG: 81 TABLET, COATED ORAL at 09:46

## 2020-09-24 RX ADMIN — CEPHALEXIN 500 MG: 500 CAPSULE ORAL at 11:28

## 2020-09-24 RX ADMIN — LISINOPRIL 40 MG: 20 TABLET ORAL at 09:47

## 2020-09-24 RX ADMIN — TIZANIDINE 4 MG: 2 TABLET ORAL at 09:47

## 2020-09-24 RX ADMIN — CEFAZOLIN SODIUM 2000 MG: 2 SOLUTION INTRAVENOUS at 09:46

## 2020-09-24 RX ADMIN — OXYCODONE HYDROCHLORIDE 10 MG: 10 TABLET ORAL at 14:47

## 2020-09-24 RX ADMIN — OXYCODONE HYDROCHLORIDE 10 MG: 10 TABLET ORAL at 04:52

## 2020-09-24 RX ADMIN — OXYCODONE HYDROCHLORIDE 10 MG: 10 TABLET ORAL at 09:48

## 2020-09-24 RX ADMIN — Medication 1 TABLET: at 09:47

## 2020-09-24 NOTE — PROGRESS NOTES
Progress Note - Infectious Disease   Sherman Mello  60 y o  male MRN: [id number]  Unit/Bed#: E2 -01 Encounter: [id number]      Impression/Plan:  1  Cellulitis and olecranon bursitis of the right upper extremity  History and exam appear consistent with cellulitis likely due to olecranon bursitis  Sensitivity and waxing and waning discomfort persist   Aspiration unsuccessful  Patient is hemodynamically stable and blood cultures negative  CT with consistent findings and without any abscess noted  Signs of calcification on CT also noted and so there may have a component of CPPD/gout arthropathy  Transition oral Keflex 500 mg every 6 hours  Plan for total of 14 days of therapy through 10/2  Continue to trend fever curve/vitals while admitted  Continue to monitor labs while admitted  Serial exams while admitted  Orthopedic evaluation appreciated  Additional supportive care as per primary  Patient otherwise cleared from ID standpoint for discharge      2  Diabetes and obesity  Patient noted to have relatively well controlled diabetes  BMI noted to be 38  Glucose management per primary  Recommend discussion of further weight loss as outpatient     3  History of gouty arthritis  Patient reports previous history of disease involving his toes  Has not had an attack for many years and no new medications to report  May be contributing to symptoms as above  Follow-up with PCP as outpatient      Above plan discussed in detail with the patient at bedside      ID consult service will sign off at this time  Please call if questions  Antibiotics:  Keflex 1  Total antibiotic 6    Subjective:  Patient had his arm on wrapped by Orthopedics  He still has ongoing sensitivity and discomfort but again has improved since admission  Patient is aware of going home  He is also aware of maintaining rest and frequent stretching and staying away from work for a short period      Objective:  Vitals:  Temp:  [96 6 °F (35 9 °C)-97 1 °F (36 2 °C)] 97 1 °F (36 2 °C)  HR:  [50-54] 50  Resp:  [16-18] 18  BP: (128-141)/(67-80) 128/78  SpO2:  [98 %-100 %] 100 %  Temp (24hrs), Av 8 °F (36 °C), Min:96 6 °F (35 9 °C), Max:97 1 °F (36 2 °C)  Current: Temperature: (!) 97 1 °F (36 2 °C)    Physical Exam:   General Appearance:  Alert, interactive, nontoxic, no acute distress  Throat: Oropharynx moist without lesions  Lungs:   Clear to auscultation bilaterally; no wheezes, rhonchi or rales; respirations unlabored on room air   Heart:  RRR; no murmur, rub or gallop appreciated   Abdomen:   Soft, non-tender, non-distended, positive bowel sounds  Extremities: No clubbing, cyanosis or edema   Skin: No new rashes or lesions  No new draining wounds noted  Patient has very mild swelling at the elbow on the right  There is no erythema or warmth appreciated at this site  Labs, Imaging, & Other studies:   All pertinent labs and imaging studies were personally reviewed  Results from last 7 days   Lab Units 20  0434 20  0506 20  1326   WBC Thousand/uL 5 09 8 01 8 17   HEMOGLOBIN g/dL 12 6 13 9 14 8   PLATELETS Thousands/uL 161 159 184     Results from last 7 days   Lab Units 20  0506 20  1326   POTASSIUM mmol/L 3 8 3 8   CHLORIDE mmol/L 103 103   CO2 mmol/L 26 28   BUN mg/dL 12 12   CREATININE mg/dL 1 00 1 05   EGFR ml/min/1 73sq m 79 75   CALCIUM mg/dL 8 8 9 0   AST U/L 15 18   ALT U/L 30 38   ALK PHOS U/L 75 81     Results from last 7 days   Lab Units 20  1748 20  1731   BLOOD CULTURE  No Growth After 4 Days  No Growth After 4 Days

## 2020-09-24 NOTE — DISCHARGE SUMMARY
Discharge- Aleja Harris  1955, 60 y o  male MRN: [id number]    Unit/Bed#: E2 -01 Encounter: [id number]    Primary Care Provider: Mona Cho MD   Date and time admitted to hospital: 9/19/2020 12:10 PM        * Cellulitis of right upper extremity  Assessment & Plan  60year old male admitted due to right upper extremity cellulitis not likely due to septic arthritis as per orthopedics  S/p aspiration with no fluid drain   - discharge with p o  Antibiotics as per infectious disease  Complete Keflex through October 2, 2020    - Cultures  No growth to date    Results from last 7 days   Lab Units 09/23/20  0434 09/19/20  1748 09/19/20  1731   PROCALCITONIN ng/ml <0 05  --   --    BLOOD CULTURE   --  No Growth After 4 Days  No Growth After 4 Days  Essential hypertension  Assessment & Plan  Controlled  · Continue home lisinopril while inpatient    Type 2 diabetes mellitus without complication, without long-term current use of insulin [facility])  Assessment & Plan  Lab Results   Component Value Date    HGBA1C 4 9 03/15/2019     Recent Labs     09/23/20  1619 09/23/20  2132 09/24/20  0712 09/24/20  1057   POCGLU 147* 156* 106 140     · Continue home medications    Lung nodule seen on imaging study  Assessment & Plan  8 x 7 mm RUL nodule  - Repeat imaging in 6-12 months  Discharging Physician / Practitioner: Siva Siddiqi MD  PCP: Mona Cho MD  Admission Date:   Admission Orders (From admission, onward)     Ordered        09/19/20 1726  Inpatient Admission  Once                   Discharge Date: 09/24/20    Resolved Problems  Date Reviewed: 9/24/2020    None          Consultations During Hospital Stay:  · ID, ortho, surgery    Procedures Performed:   · Right Elbow aspiration: No fluid drained    Significant Findings / Test Results:   · Imaging  · X-ray shoulder right: No acute osseous abnormality  Mild osteoarthritis acromioclavicular joint      · X-ray humerus:No acute osseous abnormality  · X-ray elbow right: No acute osseous abnormality  Soft tissue swelling over the olecranon process, which could be olecranon bursitis  · CT spine cervical without contrast: Mild multilevel degenerative spondylosis  No cervical spine fracture or traumatic malalignment  · CTA chest without and with contrast    No acute abnormality seen to account for right arm pain  Specifically, no evidence of arterial impingement or occlusive disease  There is an 8 x 7 mm nodule in the posterior right upper lobe  No prior comparison studies  Based on current Fleischner Society 2017 Guidelines on incidental pulmonary nodule, followup non-contrast CT is recommended at 6-12 months from the initial examination and, if stable at that time, an additional followup is recommended for 18-24 months from the initial examination  · VAS upper limb venous scan unilateral right    RIGHT UPPER LIMB:  No evidence of acute or chronic deep vein thrombosis  No evidence of superficial thrombophlebitis noted  Doppler evaluation shows a normal response to augmentation maneuvers  LEFT UPPER LIMB LIMITED:  Evaluation shows no evidence of thrombus in the internal jugular vein,  subclavian vein, and the brachiocephalic vein  · CT upper extremity with contrast right    Acute calcific tendinitis triceps tendon along with mild olecranon bursitis   No intramuscular abscess seen  Radiocapitellar arthritis  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    Incidental Findings:   · Pulmonary nodule    Test Results Pending at Discharge (will require follow up):    · Blood cultures     Outpatient Tests Requested:  · Followup: Ortho and PCP  · Repeat CT-scan in 4-48 months    Complications:  None    Reason for Admission: right arm swelling    Hospital Course:     María Elena Trimble  is a 60 y o  male patient who originally presented to the hospital on 9/19/2020 due to right arm swelling  71-year-old male presented to our institution due to right elbow swelling  Imaging studies were performed  He was started on IV antibiotics  Due to the nature of his presentation,  orthopedics and surgery were both consulted  Surgery deferred to orthopedics  Orthopedics attempted to perform aspiration of his right elbow joint, no fluid was present  Ortho had low suspicion for septic arthritis and instead think this is purely cellulitis  Infectious disease was consulted for their opinion regarding discharge antibiotic management  They recommended CT scan of his right upper extremity which did not show any abscess  Venous duplex did not show any DVT  As such, patient currently feels much better compared to his initial admission  Still with some swelling and tenderness, but much improved  Orthopedic surgery cleared him for DC and recommended outpatient follow-up with them within a week or 2  Will be discharging him with oxycodone for pain control  He agreed not to operate [employer] or drive whenever he would have to take the oxycodone  Patient agrees to complete his antibiotic therapy and prescriptions were sent to his pharmacy  He currently has no questions any and is eager to go home  Patient agrees to follow-up with his providers as scheduled and to take his medications as prescribed  All questions were addressed  he understood the need to seek immediate medical attention should he develop any chest pain, shortness of breath, severe pain, fever, chills, or any other concerning symptoms  Please see above list of diagnoses and related plan for additional information  Condition at Discharge: fair     Discharge Day Visit / Exam:     Subjective:  Patient seen and examined at bedside  Comfortable  Pain improved compared to yesterday    Vitals: Blood Pressure: 128/78 (09/24/20 0723)  Pulse: (!) 50 (09/24/20 0723)  Temperature: (!) 97 1 °F (36 2 °C) (09/24/20 6441)  Temp Source: Temporal (09/24/20 0723)  Respirations: 18 (09/24/20 0723)  Height: 5' 11" (180 3 cm) (09/19/20 1824)  Weight - Scale: 125 kg (274 lb 14 6 oz) (09/19/20 1204)  SpO2: 100 % (09/24/20 0723)  Exam:   Physical Exam  HENT:      Head: Normocephalic  Nose: Nose normal       Mouth/Throat:      Mouth: Mucous membranes are moist    Eyes:      General: No scleral icterus  Extraocular Movements: Extraocular movements intact  Pupils: Pupils are equal, round, and reactive to light  Cardiovascular:      Rate and Rhythm: Normal rate  Pulmonary:      Effort: Pulmonary effort is normal  No respiratory distress  Breath sounds: No wheezing  Abdominal:      General: There is no distension  Palpations: Abdomen is soft  Tenderness: There is no abdominal tenderness  Musculoskeletal:         General: Swelling (Improved) and tenderness (Improved) present  Comments: Still with some limitation in ROM of his right elbow due to pain, but improved   Skin:     General: Skin is warm  Neurological:      Mental Status: He is alert  Mental status is at baseline  Psychiatric:         Mood and Affect: Mood normal          Behavior: Behavior normal        Discharge instructions/Information to patient and family:   See after visit summary for information provided to patient and family  Provisions for Follow-Up Care:  See after visit summary for information related to follow-up care and any pertinent home health orders  Disposition:     Home    For Discharges to   [address] SNF:   · Not Applicable to this Patient - Not Applicable to this Patient    Planned Readmission: No     Discharge Statement:  I spent 37 minutes discharging the patient  This time was spent on the day of discharge  I had direct contact with the patient on the day of discharge   Greater than 50% of the total time was spent examining patient, answering all patient questions, arranging and discussing plan of care with patient as well as directly providing post-discharge instructions  Additional time then spent on discharge activities  Discharge Medications:  See after visit summary for reconciled discharge medications provided to patient and family        ** Please Note: This note has been constructed using a voice recognition system **

## 2020-09-24 NOTE — PLAN OF CARE
Problem: Potential for Falls  Goal: Patient will remain free of falls  Description: INTERVENTIONS:  - Assess patient frequently for physical needs  -  Identify cognitive and physical deficits and behaviors that affect risk of falls    -  [place] fall precautions as indicated by assessment   - Educate patient/family on patient safety including physical limitations  - Instruct patient to call for assistance with activity based on assessment  - Modify environment to reduce risk of injury  - Consider OT/PT consult to assist with strengthening/mobility  9/24/2020 1549 by Jaiden Olea RN  Outcome: Adequate for Discharge  9/24/2020 1122 by Jaiden Olea RN  Outcome: Progressing     Problem: PAIN - ADULT  Goal: Verbalizes/displays adequate comfort level or baseline comfort level  Description: Interventions:  - Encourage patient to monitor pain and request assistance  - Assess pain using appropriate pain scale  - Administer analgesics based on type and severity of pain and evaluate response  - Implement non-pharmacological measures as appropriate and evaluate response  - Consider cultural and social influences on pain and pain management  - Notify physician/advanced practitioner if interventions unsuccessful or patient reports new pain  Outcome: Adequate for Discharge     Problem: INFECTION - ADULT  Goal: Absence or prevention of progression during hospitalization  Description: INTERVENTIONS:  - Assess and monitor for signs and symptoms of infection  - Monitor lab/diagnostic results  - Monitor all insertion sites, i e  indwelling lines, tubes, and drains  - Monitor endotracheal if appropriate and nasal secretions for changes in amount and color  - [place] appropriate cooling/warming therapies per order  - Administer medications as ordered  - Instruct and encourage patient and family to use good hand hygiene technique  - Identify and instruct in appropriate isolation precautions for identified infection/condition  Outcome: Adequate for Discharge     Problem: SAFETY ADULT  Goal: Patient will remain free of falls  Description: INTERVENTIONS:  - Assess patient frequently for physical needs  -  Identify cognitive and physical deficits and behaviors that affect risk of falls    -  [place] fall precautions as indicated by assessment   - Educate patient/family on patient safety including physical limitations  - Instruct patient to call for assistance with activity based on assessment  - Modify environment to reduce risk of injury  - Consider OT/PT consult to assist with strengthening/mobility  9/24/2020 1549 by Alex Lopez RN  Outcome: Adequate for Discharge  9/24/2020 1122 by Alex Lopez RN  Outcome: Progressing  Goal: Maintain or return to baseline ADL function  Description: INTERVENTIONS:  -  Assess patient's ability to carry out ADLs; assess patient's baseline for ADL function and identify physical deficits which impact ability to perform ADLs (bathing, care of mouth/teeth, toileting, grooming, dressing, etc )  - Assess/evaluate cause of self-care deficits   - Assess range of motion  - Assess patient's mobility; develop plan if impaired  - Assess patient's need for assistive devices and provide as appropriate  - Encourage maximum independence but intervene and supervise when necessary  - Involve family in performance of ADLs  - Assess for home care needs following discharge   - Consider OT consult to assist with ADL evaluation and planning for discharge  - Provide patient education as appropriate  Outcome: Adequate for Discharge  Goal: Maintain or return mobility status to optimal level  Description: INTERVENTIONS:  - Assess patient's baseline mobility status (ambulation, transfers, stairs, etc )    - Identify cognitive and physical deficits and behaviors that affect mobility  - Identify mobility aids required to assist with transfers and/or ambulation (gait belt, sit-to-stand, lift, walker, cane, etc )  - [place] fall precautions as indicated by assessment  - Record patient progress and toleration of activity level on Mobility SBAR; progress patient to next Phase/Stage  - Instruct patient to call for assistance with activity based on assessment  - Consider rehabilitation consult to assist with strengthening/weightbearing, etc   Outcome: Adequate for Discharge     Problem: DISCHARGE PLANNING  Goal: Discharge to home or other facility with appropriate resources  Description: INTERVENTIONS:  - Identify barriers to discharge w/patient and caregiver  - Arrange for needed discharge resources and transportation as appropriate  - Identify discharge learning needs (meds, wound care, etc )  - Refer to Case Management Department for coordinating discharge planning if the patient needs post-hospital services based on physician/advanced practitioner order or complex needs related to functional status, cognitive ability, or social support system  Outcome: Adequate for Discharge     Problem: Knowledge Deficit  Goal: Patient/family/caregiver demonstrates understanding of disease process, treatment plan, medications, and discharge instructions  Description: Complete learning assessment and assess knowledge base    Interventions:  - Provide teaching at level of understanding  - Provide teaching via preferred learning methods  Outcome: Adequate for Discharge     Problem: MUSCULOSKELETAL - ADULT  Goal: Maintain or return mobility to safest level of function  Description: INTERVENTIONS:  - Assess patient's ability to carry out ADLs; assess patient's baseline for ADL function and identify physical deficits which impact ability to perform ADLs (bathing, care of mouth/teeth, toileting, grooming, dressing, etc )  - Assess/evaluate cause of self-care deficits   - Assess range of motion  - Assess patient's mobility  - Assess patient's need for assistive devices and provide as appropriate  - Encourage maximum independence but intervene and supervise when necessary  - Involve family in performance of ADLs  - Assess for home care needs following discharge   - Consider OT consult to assist with ADL evaluation and planning for discharge  - Provide patient education as appropriate  Outcome: Adequate for Discharge  Goal: Maintain proper alignment of affected body part  Description: INTERVENTIONS:  - Support, maintain and protect limb and body alignment  - Provide patient/ family with appropriate education  Outcome: Adequate for Discharge

## 2020-09-24 NOTE — PROGRESS NOTES
Orthopaedic Surgery - Progress Note  Sha Cuellar  (63 y o  male)   : 1955   MRN: [id number]  Date: 2020   Encounter: [id number]   Unit/Bed#: E2 -01    Assessment / Plan  Cellulitis right elbow with continued improvement with IV antibiotics    · Continue antibiotics per ID recommendations, transition to p o  For home  · Patient can continue to use splint as needed at this time for comfort only  Okay to start performing range-of-motion exercises with the right arm as tolerated  · Continue with frequent elevation of the right arm and analgesics as needed  · May use other modalities such as heat or ice as needed  · From orthopedic standpoint can be discharged home on p o  antibiotics per IDs recommendation  · Follow-up in 1 week for re-evaluation as outpatient  Subjective  23-year-old male with right upper extremity cellulitis with continued improvement  Since discontinuing the fluid, switching the IV and placing the patient in a splint he has had significant improvement in his pain and swelling  He is able to move around much better today than yesterday  Distally no new neurovascular changes  Vitals  Temp:  [96 6 °F (35 9 °C)-97 1 °F (36 2 °C)] 97 1 °F (36 2 °C)  HR:  [50-54] 50  Resp:  [16-18] 18  BP: (128-141)/(67-80) 128/78 splint  Body mass index is 38 34 kg/m²  I/O last 24 hours: In: 310 [P O :220; I V :40; IV Piggyback:50]  Out: -     Ortho Exam - Right Upper Extremity  · Tenderness and mild swelling/erythema just distal to the olecranon laterally but improved significantly since yesterday    Swelling has resolved in the right hand and wrist   · Elbow ROM  easily  · Full finger and wrist ROM today  · Sensation intact Ax/R/M/U nerves with absent sensation in the ring and small fingers due to previous hand injury  · 2+ radial pulse  · Limited left and right rotation of neck  · Mild Left side paraspinal cervical muscle tenderness       Lab Results  (I have personally reviewed pertinent lab results )  Results from last 7 days   Lab Units 09/23/20  0434 09/20/20  0506 09/19/20  1326   WBC Thousand/uL 5 09 8 01 8 17   HEMOGLOBIN g/dL 12 6 13 9 14 8   HEMATOCRIT % 39 7 43 3 44 7   PLATELETS Thousands/uL 161 159 184     Results from last 7 days   Lab Units 09/19/20  1326   PTT seconds 34   INR  1 10     Results from last 7 days   Lab Units 09/20/20  0506 09/19/20  1326   POTASSIUM mmol/L 3 8 3 8   CHLORIDE mmol/L 103 103   CO2 mmol/L 26 28   BUN mg/dL 12 12   CREATININE mg/dL 1 00 1 05   EGFR ml/min/1 73sq m 79 75   CALCIUM mg/dL 8 8 9 0   ALK PHOS U/L 75 81   ALT U/L 30 38   AST U/L 15 18         Results from last 7 days   Lab Units 09/19/20  1748 09/19/20  1731   BLOOD CULTURE  No Growth After 4 Days  No Growth After 4 Days         Hadley Nava PA-C

## 2020-09-24 NOTE — ASSESSMENT & PLAN NOTE
60year old male admitted due to right upper extremity cellulitis not likely due to septic arthritis as per orthopedics  S/p aspiration with no fluid drain   - discharge with p o  Antibiotics as per infectious disease  Complete Keflex through October 2, 2020    - Cultures  No growth to date    Results from last 7 days   Lab Units 09/23/20  0434 09/19/20  1748 09/19/20  1731   PROCALCITONIN ng/ml <0 05  --   --    BLOOD CULTURE   --  No Growth After 4 Days  No Growth After 4 Days

## 2020-09-24 NOTE — PLAN OF CARE
Problem: Potential for Falls  Goal: Patient will remain free of falls  Description: INTERVENTIONS:  - Assess patient frequently for physical needs  -  Identify cognitive and physical deficits and behaviors that affect risk of falls  -  [place] fall precautions as indicated by assessment   - Educate patient/family on patient safety including physical limitations  - Instruct patient to call for assistance with activity based on assessment  - Modify environment to reduce risk of injury  - Consider OT/PT consult to assist with strengthening/mobility  Outcome: Progressing     Problem: SAFETY ADULT  Goal: Patient will remain free of falls  Description: INTERVENTIONS:  - Assess patient frequently for physical needs  -  Identify cognitive and physical deficits and behaviors that affect risk of falls    -  [place] fall precautions as indicated by assessment   - Educate patient/family on patient safety including physical limitations  - Instruct patient to call for assistance with activity based on assessment  - Modify environment to reduce risk of injury  - Consider OT/PT consult to assist with strengthening/mobility  Outcome: Progressing

## 2020-09-24 NOTE — ASSESSMENT & PLAN NOTE
Lab Results   Component Value Date    HGBA1C 4 9 03/15/2019     Recent Labs     09/23/20  1619 09/23/20  2132 09/24/20  0712 09/24/20  1057   POCGLU 147* 156* 106 140     · Continue home medications

## 2020-09-25 LAB
BACTERIA BLD CULT: NORMAL
BACTERIA BLD CULT: NORMAL

## 2024-02-02 ENCOUNTER — HOSPITAL ENCOUNTER (OUTPATIENT)
Dept: RADIOLOGY | Facility: IMAGING CENTER | Age: 69
End: 2024-02-02
Payer: MEDICARE

## 2024-02-02 DIAGNOSIS — S13.4XXA SPRAIN OF LIGAMENTS OF CERVICAL SPINE, INITIAL ENCOUNTER: ICD-10-CM

## 2024-02-02 PROCEDURE — G1004 CDSM NDSC: HCPCS

## 2024-02-02 PROCEDURE — 72141 MRI NECK SPINE W/O DYE: CPT
